# Patient Record
Sex: MALE | Race: WHITE | NOT HISPANIC OR LATINO
[De-identification: names, ages, dates, MRNs, and addresses within clinical notes are randomized per-mention and may not be internally consistent; named-entity substitution may affect disease eponyms.]

---

## 2017-06-16 ENCOUNTER — TRANSCRIPTION ENCOUNTER (OUTPATIENT)
Age: 82
End: 2017-06-16

## 2017-06-19 ENCOUNTER — OUTPATIENT (OUTPATIENT)
Dept: OUTPATIENT SERVICES | Facility: HOSPITAL | Age: 82
LOS: 1 days | End: 2017-06-19
Payer: MEDICARE

## 2017-06-19 DIAGNOSIS — H26.491 OTHER SECONDARY CATARACT, RIGHT EYE: ICD-10-CM

## 2017-06-19 DIAGNOSIS — Z98.89 OTHER SPECIFIED POSTPROCEDURAL STATES: Chronic | ICD-10-CM

## 2017-06-19 PROCEDURE — 66821 AFTER CATARACT LASER SURGERY: CPT | Mod: RT

## 2018-09-08 ENCOUNTER — EMERGENCY (EMERGENCY)
Facility: HOSPITAL | Age: 83
LOS: 0 days | Discharge: HOME | End: 2018-09-08
Attending: EMERGENCY MEDICINE | Admitting: EMERGENCY MEDICINE
Payer: MEDICARE

## 2018-09-08 VITALS
DIASTOLIC BLOOD PRESSURE: 72 MMHG | OXYGEN SATURATION: 98 % | SYSTOLIC BLOOD PRESSURE: 151 MMHG | HEART RATE: 60 BPM | RESPIRATION RATE: 18 BRPM

## 2018-09-08 VITALS
OXYGEN SATURATION: 100 % | RESPIRATION RATE: 18 BRPM | DIASTOLIC BLOOD PRESSURE: 85 MMHG | SYSTOLIC BLOOD PRESSURE: 159 MMHG | HEIGHT: 72 IN | WEIGHT: 195.11 LBS | TEMPERATURE: 98 F | HEART RATE: 56 BPM

## 2018-09-08 DIAGNOSIS — K62.5 HEMORRHAGE OF ANUS AND RECTUM: ICD-10-CM

## 2018-09-08 DIAGNOSIS — N40.0 BENIGN PROSTATIC HYPERPLASIA WITHOUT LOWER URINARY TRACT SYMPTOMS: ICD-10-CM

## 2018-09-08 DIAGNOSIS — K64.9 UNSPECIFIED HEMORRHOIDS: ICD-10-CM

## 2018-09-08 DIAGNOSIS — Z79.02 LONG TERM (CURRENT) USE OF ANTITHROMBOTICS/ANTIPLATELETS: ICD-10-CM

## 2018-09-08 DIAGNOSIS — Z98.89 OTHER SPECIFIED POSTPROCEDURAL STATES: Chronic | ICD-10-CM

## 2018-09-08 DIAGNOSIS — I10 ESSENTIAL (PRIMARY) HYPERTENSION: ICD-10-CM

## 2018-09-08 DIAGNOSIS — E78.5 HYPERLIPIDEMIA, UNSPECIFIED: ICD-10-CM

## 2018-09-08 DIAGNOSIS — E03.9 HYPOTHYROIDISM, UNSPECIFIED: ICD-10-CM

## 2018-09-08 LAB
ABO RH CONFIRMATION: SIGNIFICANT CHANGE UP
ALBUMIN SERPL ELPH-MCNC: 4 G/DL — SIGNIFICANT CHANGE UP (ref 3.3–5)
ALP SERPL-CCNC: 83 U/L — SIGNIFICANT CHANGE UP (ref 40–120)
ALT FLD-CCNC: 22 U/L — SIGNIFICANT CHANGE UP (ref 12–78)
ANION GAP SERPL CALC-SCNC: 9 MMOL/L — SIGNIFICANT CHANGE UP (ref 5–17)
APTT BLD: 32.2 SEC — SIGNIFICANT CHANGE UP (ref 27.5–37.4)
AST SERPL-CCNC: 22 U/L — SIGNIFICANT CHANGE UP (ref 15–37)
BASOPHILS # BLD AUTO: 0.04 K/UL — SIGNIFICANT CHANGE UP (ref 0–0.2)
BASOPHILS NFR BLD AUTO: 0.5 % — SIGNIFICANT CHANGE UP (ref 0–2)
BILIRUB SERPL-MCNC: 1.2 MG/DL — SIGNIFICANT CHANGE UP (ref 0.2–1.2)
BLD GP AB SCN SERPL QL: SIGNIFICANT CHANGE UP
BUN SERPL-MCNC: 25 MG/DL — HIGH (ref 7–23)
CALCIUM SERPL-MCNC: 9.6 MG/DL — SIGNIFICANT CHANGE UP (ref 8.5–10.1)
CHLORIDE SERPL-SCNC: 105 MMOL/L — SIGNIFICANT CHANGE UP (ref 96–108)
CO2 SERPL-SCNC: 28 MMOL/L — SIGNIFICANT CHANGE UP (ref 22–31)
CREAT SERPL-MCNC: 1.4 MG/DL — HIGH (ref 0.5–1.3)
EOSINOPHIL # BLD AUTO: 0.14 K/UL — SIGNIFICANT CHANGE UP (ref 0–0.5)
EOSINOPHIL NFR BLD AUTO: 1.6 % — SIGNIFICANT CHANGE UP (ref 0–6)
GLUCOSE SERPL-MCNC: 91 MG/DL — SIGNIFICANT CHANGE UP (ref 70–99)
HCT VFR BLD CALC: 46.4 % — SIGNIFICANT CHANGE UP (ref 39–50)
HGB BLD-MCNC: 15.9 G/DL — SIGNIFICANT CHANGE UP (ref 13–17)
IMM GRANULOCYTES NFR BLD AUTO: 0.3 % — SIGNIFICANT CHANGE UP (ref 0–1.5)
INR BLD: 1.08 RATIO — SIGNIFICANT CHANGE UP (ref 0.88–1.16)
LYMPHOCYTES # BLD AUTO: 2.14 K/UL — SIGNIFICANT CHANGE UP (ref 1–3.3)
LYMPHOCYTES # BLD AUTO: 24.3 % — SIGNIFICANT CHANGE UP (ref 13–44)
MCHC RBC-ENTMCNC: 29.9 PG — SIGNIFICANT CHANGE UP (ref 27–34)
MCHC RBC-ENTMCNC: 34.3 GM/DL — SIGNIFICANT CHANGE UP (ref 32–36)
MCV RBC AUTO: 87.4 FL — SIGNIFICANT CHANGE UP (ref 80–100)
MONOCYTES # BLD AUTO: 0.71 K/UL — SIGNIFICANT CHANGE UP (ref 0–0.9)
MONOCYTES NFR BLD AUTO: 8.1 % — SIGNIFICANT CHANGE UP (ref 2–14)
NEUTROPHILS # BLD AUTO: 5.75 K/UL — SIGNIFICANT CHANGE UP (ref 1.8–7.4)
NEUTROPHILS NFR BLD AUTO: 65.2 % — SIGNIFICANT CHANGE UP (ref 43–77)
NRBC # BLD: 0 /100 WBCS — SIGNIFICANT CHANGE UP (ref 0–0)
PLATELET # BLD AUTO: 201 K/UL — SIGNIFICANT CHANGE UP (ref 150–400)
POTASSIUM SERPL-MCNC: 3.7 MMOL/L — SIGNIFICANT CHANGE UP (ref 3.5–5.3)
POTASSIUM SERPL-SCNC: 3.7 MMOL/L — SIGNIFICANT CHANGE UP (ref 3.5–5.3)
PROT SERPL-MCNC: 7.5 GM/DL — SIGNIFICANT CHANGE UP (ref 6–8.3)
PROTHROM AB SERPL-ACNC: 11.7 SEC — SIGNIFICANT CHANGE UP (ref 9.8–12.7)
RBC # BLD: 5.31 M/UL — SIGNIFICANT CHANGE UP (ref 4.2–5.8)
RBC # FLD: 13.3 % — SIGNIFICANT CHANGE UP (ref 10.3–14.5)
SODIUM SERPL-SCNC: 142 MMOL/L — SIGNIFICANT CHANGE UP (ref 135–145)
TYPE + AB SCN PNL BLD: SIGNIFICANT CHANGE UP
WBC # BLD: 8.81 K/UL — SIGNIFICANT CHANGE UP (ref 3.8–10.5)
WBC # FLD AUTO: 8.81 K/UL — SIGNIFICANT CHANGE UP (ref 3.8–10.5)

## 2018-09-08 PROCEDURE — 99284 EMERGENCY DEPT VISIT MOD MDM: CPT

## 2018-09-08 RX ORDER — LOSARTAN POTASSIUM 100 MG/1
1 TABLET, FILM COATED ORAL
Qty: 0 | Refills: 0 | COMMUNITY

## 2018-09-08 RX ORDER — SODIUM CHLORIDE 9 MG/ML
3 INJECTION INTRAMUSCULAR; INTRAVENOUS; SUBCUTANEOUS ONCE
Qty: 0 | Refills: 0 | Status: COMPLETED | OUTPATIENT
Start: 2018-09-08 | End: 2018-09-08

## 2018-09-08 RX ADMIN — SODIUM CHLORIDE 3 MILLILITER(S): 9 INJECTION INTRAMUSCULAR; INTRAVENOUS; SUBCUTANEOUS at 18:41

## 2018-09-08 NOTE — ED PROVIDER NOTE - PMH
Benign prostatic hypertrophy    Essential tremor    Hemorrhoids    Hypercholesterolemia    Hypertension    Hypothyroid

## 2018-09-08 NOTE — ED PROVIDER NOTE - OBJECTIVE STATEMENT
83 y/o male with a PMHx of HTN, HLD, and Hypothyroidism, hemorrhoids presenting to the ED with wife c/o rectal bleeding today. Pt reports that he was sitting on the toilet and did not have a bowel movement but when he stood up, he noticed bright red blood filling the toilet. Reports that he after wiping, he did not see any blood on the toilet paper. Pt sat on the toilet again, saw blood in toilet, wiped, but there was no blood on toilet paper again. Pt had colonoscopy performed two years ago which showed one polyp but otherwise unremarkable. Biopsy was performed on polyp and it was determined to be benign.

## 2018-09-08 NOTE — ED ADULT NURSE NOTE - NSIMPLEMENTINTERV_GEN_ALL_ED
Implemented All Universal Safety Interventions:  [place] to call system. Call bell, personal items and telephone within reach. Instruct patient to call for assistance. Room bathroom lighting operational. Non-slip footwear when patient is off stretcher. Physically safe environment: no spills, clutter or unnecessary equipment. Stretcher in lowest position, wheels locked, appropriate side rails in place.

## 2018-09-08 NOTE — ED STATDOCS - NS_ ATTENDINGSCRIBEDETAILS _ED_A_ED_FT
I Michele Ramesh MD saw and examined the patient. Scribe documented for me and under my supervision. I have modified the scribe's documentation where necessary to reflect my history, physical exam and other relevant documentations pertinent to the care of the patient.

## 2018-09-08 NOTE — ED PROVIDER NOTE - MEDICAL DECISION MAKING DETAILS
No rectal bleeding on exam.  Guaiac negative.  Pt with hemorrhoids, likely cause.  Hgb normal.  INR normal.  Pt currently asymptomatic.  Okay for d/c home, f/u with GI Dr. Ramirez.

## 2018-09-08 NOTE — ED STATDOCS - PROGRESS NOTE DETAILS
Fanny GUTIÉRREZ for ED attending, Dr. Ramesh: 83 y/o M with a PMHx of HTN, HLD, and Hypothyroidism presenting to the ED with wife c/o rectal bleeding today. Pt reports that he was sitting on the toilet and did not have a bowel movement but when he stood up, he noticed bright red blood filling the toilet. Reports that he after wiping, he did not see any blood on the toilet paper. Pt had colonoscopy performed two years ago which showed one polyp but otherwise unremarkable. Biopsy was performed on polyp and it was determined to be benign. Will send patient to main ED for further evaluation.

## 2018-09-08 NOTE — ED ADULT NURSE NOTE - PMH
Benign prostatic hypertrophy    Essential tremor    Hypercholesterolemia    Hypertension    Hypothyroid

## 2018-09-08 NOTE — ED PROVIDER NOTE - GASTROINTESTINAL, MLM
Abdomen soft, non-tender, no guarding. Multiple hemorrhoids. No obvious fissures.  Mild pain with rectal exam. No active bleeding. Guaiac negative.

## 2021-07-16 PROBLEM — K64.9 UNSPECIFIED HEMORRHOIDS: Chronic | Status: ACTIVE | Noted: 2018-09-08

## 2021-10-07 ENCOUNTER — APPOINTMENT (OUTPATIENT)
Dept: GASTROENTEROLOGY | Facility: CLINIC | Age: 86
End: 2021-10-07
Payer: MEDICARE

## 2021-10-07 VITALS
WEIGHT: 197 LBS | HEART RATE: 56 BPM | SYSTOLIC BLOOD PRESSURE: 125 MMHG | DIASTOLIC BLOOD PRESSURE: 74 MMHG | BODY MASS INDEX: 26.68 KG/M2 | HEIGHT: 72 IN

## 2021-10-07 DIAGNOSIS — Z12.11 ENCOUNTER FOR SCREENING FOR MALIGNANT NEOPLASM OF COLON: ICD-10-CM

## 2021-10-07 DIAGNOSIS — Z12.12 ENCOUNTER FOR SCREENING FOR MALIGNANT NEOPLASM OF COLON: ICD-10-CM

## 2021-10-07 PROCEDURE — 99202 OFFICE O/P NEW SF 15 MIN: CPT

## 2021-10-07 NOTE — HISTORY OF PRESENT ILLNESS
[FreeTextEntry1] : The patient has enjoyed good health remains on Plavix for coronary artery disease had a colonoscopy in [place] 5 years ago, which was reportedly unrevealing, here for followup. Colon cancer screening. He denies any change in bowel habits. He denies abdominal pain. He denies any reports of anemia

## 2022-05-16 RX ORDER — INDOMETHACIN 50 MG
1 CAPSULE ORAL
Qty: 0 | Refills: 0 | DISCHARGE
Start: 2022-05-16

## 2022-05-17 ENCOUNTER — NON-APPOINTMENT (OUTPATIENT)
Age: 87
End: 2022-05-17

## 2022-05-18 ENCOUNTER — INPATIENT (INPATIENT)
Facility: HOSPITAL | Age: 87
LOS: 0 days | Discharge: HOME | DRG: 310 | End: 2022-05-19
Attending: FAMILY MEDICINE | Admitting: INTERNAL MEDICINE
Payer: MEDICARE

## 2022-05-18 VITALS
HEART RATE: 52 BPM | WEIGHT: 195.11 LBS | RESPIRATION RATE: 18 BRPM | SYSTOLIC BLOOD PRESSURE: 108 MMHG | OXYGEN SATURATION: 100 % | HEIGHT: 72 IN | DIASTOLIC BLOOD PRESSURE: 57 MMHG | TEMPERATURE: 99 F

## 2022-05-18 DIAGNOSIS — Z98.89 OTHER SPECIFIED POSTPROCEDURAL STATES: Chronic | ICD-10-CM

## 2022-05-18 DIAGNOSIS — H53.9 UNSPECIFIED VISUAL DISTURBANCE: ICD-10-CM

## 2022-05-18 LAB
ALBUMIN SERPL ELPH-MCNC: 3.4 G/DL — SIGNIFICANT CHANGE UP (ref 3.3–5)
ALP SERPL-CCNC: 78 U/L — SIGNIFICANT CHANGE UP (ref 40–120)
ALT FLD-CCNC: 25 U/L — SIGNIFICANT CHANGE UP (ref 12–78)
ANION GAP SERPL CALC-SCNC: 6 MMOL/L — SIGNIFICANT CHANGE UP (ref 5–17)
APTT BLD: 29.5 SEC — SIGNIFICANT CHANGE UP (ref 27.5–35.5)
AST SERPL-CCNC: 22 U/L — SIGNIFICANT CHANGE UP (ref 15–37)
BASOPHILS # BLD AUTO: 0.02 K/UL — SIGNIFICANT CHANGE UP (ref 0–0.2)
BASOPHILS NFR BLD AUTO: 0.3 % — SIGNIFICANT CHANGE UP (ref 0–2)
BILIRUB SERPL-MCNC: 0.9 MG/DL — SIGNIFICANT CHANGE UP (ref 0.2–1.2)
BUN SERPL-MCNC: 30 MG/DL — HIGH (ref 7–23)
CALCIUM SERPL-MCNC: 9.1 MG/DL — SIGNIFICANT CHANGE UP (ref 8.5–10.1)
CHLORIDE SERPL-SCNC: 108 MMOL/L — SIGNIFICANT CHANGE UP (ref 96–108)
CO2 SERPL-SCNC: 26 MMOL/L — SIGNIFICANT CHANGE UP (ref 22–31)
CREAT SERPL-MCNC: 1.55 MG/DL — HIGH (ref 0.5–1.3)
EGFR: 43 ML/MIN/1.73M2 — LOW
EOSINOPHIL # BLD AUTO: 0.16 K/UL — SIGNIFICANT CHANGE UP (ref 0–0.5)
EOSINOPHIL NFR BLD AUTO: 2.1 % — SIGNIFICANT CHANGE UP (ref 0–6)
FLUAV AG NPH QL: SIGNIFICANT CHANGE UP
FLUBV AG NPH QL: SIGNIFICANT CHANGE UP
GLUCOSE SERPL-MCNC: 104 MG/DL — HIGH (ref 70–99)
HCT VFR BLD CALC: 43.3 % — SIGNIFICANT CHANGE UP (ref 39–50)
HGB BLD-MCNC: 14 G/DL — SIGNIFICANT CHANGE UP (ref 13–17)
IMM GRANULOCYTES NFR BLD AUTO: 0.3 % — SIGNIFICANT CHANGE UP (ref 0–1.5)
INR BLD: 1.04 RATIO — SIGNIFICANT CHANGE UP (ref 0.88–1.16)
LYMPHOCYTES # BLD AUTO: 1.39 K/UL — SIGNIFICANT CHANGE UP (ref 1–3.3)
LYMPHOCYTES # BLD AUTO: 18.3 % — SIGNIFICANT CHANGE UP (ref 13–44)
MCHC RBC-ENTMCNC: 28.5 PG — SIGNIFICANT CHANGE UP (ref 27–34)
MCHC RBC-ENTMCNC: 32.3 GM/DL — SIGNIFICANT CHANGE UP (ref 32–36)
MCV RBC AUTO: 88.2 FL — SIGNIFICANT CHANGE UP (ref 80–100)
MONOCYTES # BLD AUTO: 0.65 K/UL — SIGNIFICANT CHANGE UP (ref 0–0.9)
MONOCYTES NFR BLD AUTO: 8.6 % — SIGNIFICANT CHANGE UP (ref 2–14)
NEUTROPHILS # BLD AUTO: 5.35 K/UL — SIGNIFICANT CHANGE UP (ref 1.8–7.4)
NEUTROPHILS NFR BLD AUTO: 70.4 % — SIGNIFICANT CHANGE UP (ref 43–77)
NT-PROBNP SERPL-SCNC: 1577 PG/ML — HIGH (ref 0–450)
PLATELET # BLD AUTO: 176 K/UL — SIGNIFICANT CHANGE UP (ref 150–400)
POTASSIUM SERPL-MCNC: 4.7 MMOL/L — SIGNIFICANT CHANGE UP (ref 3.5–5.3)
POTASSIUM SERPL-SCNC: 4.7 MMOL/L — SIGNIFICANT CHANGE UP (ref 3.5–5.3)
PROT SERPL-MCNC: 6.4 GM/DL — SIGNIFICANT CHANGE UP (ref 6–8.3)
PROTHROM AB SERPL-ACNC: 12.1 SEC — SIGNIFICANT CHANGE UP (ref 10.5–13.4)
RBC # BLD: 4.91 M/UL — SIGNIFICANT CHANGE UP (ref 4.2–5.8)
RBC # FLD: 13.9 % — SIGNIFICANT CHANGE UP (ref 10.3–14.5)
RSV RNA NPH QL NAA+NON-PROBE: SIGNIFICANT CHANGE UP
SARS-COV-2 RNA SPEC QL NAA+PROBE: SIGNIFICANT CHANGE UP
SODIUM SERPL-SCNC: 140 MMOL/L — SIGNIFICANT CHANGE UP (ref 135–145)
TROPONIN I, HIGH SENSITIVITY RESULT: 15.45 NG/L — SIGNIFICANT CHANGE UP
TROPONIN I, HIGH SENSITIVITY RESULT: 15.69 NG/L — SIGNIFICANT CHANGE UP
WBC # BLD: 7.59 K/UL — SIGNIFICANT CHANGE UP (ref 3.8–10.5)
WBC # FLD AUTO: 7.59 K/UL — SIGNIFICANT CHANGE UP (ref 3.8–10.5)

## 2022-05-18 PROCEDURE — 93306 TTE W/DOPPLER COMPLETE: CPT

## 2022-05-18 PROCEDURE — 93010 ELECTROCARDIOGRAM REPORT: CPT

## 2022-05-18 PROCEDURE — 71045 X-RAY EXAM CHEST 1 VIEW: CPT | Mod: 26

## 2022-05-18 PROCEDURE — 99285 EMERGENCY DEPT VISIT HI MDM: CPT

## 2022-05-18 PROCEDURE — 83735 ASSAY OF MAGNESIUM: CPT

## 2022-05-18 PROCEDURE — 80048 BASIC METABOLIC PNL TOTAL CA: CPT

## 2022-05-18 PROCEDURE — 36415 COLL VENOUS BLD VENIPUNCTURE: CPT

## 2022-05-18 RX ORDER — SODIUM CHLORIDE 9 MG/ML
1000 INJECTION INTRAMUSCULAR; INTRAVENOUS; SUBCUTANEOUS ONCE
Refills: 0 | Status: COMPLETED | OUTPATIENT
Start: 2022-05-18 | End: 2022-05-18

## 2022-05-18 RX ADMIN — SODIUM CHLORIDE 1000 MILLILITER(S): 9 INJECTION INTRAMUSCULAR; INTRAVENOUS; SUBCUTANEOUS at 22:06

## 2022-05-18 NOTE — ED PROVIDER NOTE - CLINICAL SUMMARY MEDICAL DECISION MAKING FREE TEXT BOX
Will eval for URI, appears to have 2nd degree Mobitz I heart block. Will eval for URI, appears to have 2nd degree Mobitz I heart block. Will continue monitoring for low HR and URI Sx at home. Will eval for URI, appears to have 2nd degree Mobitz I heart block. Will continue monitoring for bradycardia.

## 2022-05-18 NOTE — ED PROVIDER NOTE - PROGRESS NOTE DETAILS
Evaluated EKG from urgent care and had rate in 30s.  No clear p waves but appears to be regular R-R interval.  Would consider afib with block vs junctional rhythm vs slow a fib.  Possibly still small p waves, but unclear.  Discussed with Dr. Escalante who recommends admission to tele.  Discussed with Dr. Lord who recommends the same.  Will admit for further w/u and management.  Further care per inpatient treatment team.

## 2022-05-18 NOTE — ED PROVIDER NOTE - NS ED ROS FT
Constitutional: nad, well appearing  HEENT:  no nasal congestion, eye drainage or ear pain.    CVS:  no cp +bradycardia  Resp:  No sob, + cough  GI:  no abdominal pain, no nausea or vomiting  :  no dysuria  MSK: no joint pain or limited ROM  Skin: no rash  Neuro: no change in mental status or level of consciousness  Heme/lymph: no bleeding

## 2022-05-18 NOTE — ED PROVIDER NOTE - NSICDXPASTSURGICALHX_GEN_ALL_CORE_FT
PAST SURGICAL HISTORY:  History of cataract surgery, right     History of coronary angiogram 1997    S/P tonsillectomy

## 2022-05-18 NOTE — ED ADULT NURSE NOTE - OBJECTIVE STATEMENT
Pt presents to ED from urgent care for low heart rate. Pt states that he has been checking his heart rate at home with a pulse oximeter and it has been reading in the 30s which prompted him to visit urgent care. Pt had EKG done at urgent care and was told to go to ED since his heart rate was also "low" at urgent care. Pt endorses feeling tired recently. Denies chest pain, dizziness, LOC, headache, SOB.

## 2022-05-18 NOTE — ED PROVIDER NOTE - NSICDXPASTMEDICALHX_GEN_ALL_CORE_FT
PAST MEDICAL HISTORY:  Benign prostatic hypertrophy     Essential tremor     Hemorrhoids     Hypercholesterolemia     Hypertension     Hypothyroid

## 2022-05-18 NOTE — ED PROVIDER NOTE - OBJECTIVE STATEMENT
85 y/o male w/ a PMHx of HTN, HLD, Hypothyroidism, and hemorrhoids presents to the ED via EMS from  for bradycardia. Pt reports he measured his HR last night on a home machine which said he had a HR between 30-45 so went to  today for eval. Pt also reports he has a URI w/ associated cough and congestion. Denies CP, SOB, or dizziness. Pt denies hx of bradycardia. Pt HR 52 in ED.

## 2022-05-18 NOTE — ED ADULT TRIAGE NOTE - CHIEF COMPLAINT QUOTE
BIBA to ED from urgent care for c/o bradycardia, HR in 30's . PT c/o lightheadedness more than usual, cough, and voice hoarseness. Denies chest pain, SOB, and dizziness. Denies hx of bradycardia. HR 52bpm in triage, PT asymptomatic.

## 2022-05-18 NOTE — ED PROVIDER NOTE - PHYSICAL EXAMINATION
Constitutional: NAD, well appearing  HEENT: no rhinorrhea, PERRL, no oropharyngeal erythema or exudates, midline uvula.  TMs clear.  CVS: Bradycardic, regular rhythm, no m/r/g  Resp:  CTAB  GI: soft, ntnd  MSK:  no restriction to rom, full ROM to all extremities  Neuro:  A&Ox3, 5/5 strength to all extremities,  SILT to all extremities  Skin: no rash  psych: clear thought content  Heme/lymph:  No LAD

## 2022-05-19 ENCOUNTER — TRANSCRIPTION ENCOUNTER (OUTPATIENT)
Age: 87
End: 2022-05-19

## 2022-05-19 VITALS
DIASTOLIC BLOOD PRESSURE: 72 MMHG | RESPIRATION RATE: 18 BRPM | OXYGEN SATURATION: 98 % | HEART RATE: 73 BPM | SYSTOLIC BLOOD PRESSURE: 153 MMHG

## 2022-05-19 DIAGNOSIS — I25.10 ATHEROSCLEROTIC HEART DISEASE OF NATIVE CORONARY ARTERY WITHOUT ANGINA PECTORIS: ICD-10-CM

## 2022-05-19 DIAGNOSIS — G25.0 ESSENTIAL TREMOR: ICD-10-CM

## 2022-05-19 DIAGNOSIS — I10 ESSENTIAL (PRIMARY) HYPERTENSION: ICD-10-CM

## 2022-05-19 DIAGNOSIS — I44.1 ATRIOVENTRICULAR BLOCK, SECOND DEGREE: ICD-10-CM

## 2022-05-19 DIAGNOSIS — I34.0 NONRHEUMATIC MITRAL (VALVE) INSUFFICIENCY: ICD-10-CM

## 2022-05-19 DIAGNOSIS — I36.1 NONRHEUMATIC TRICUSPID (VALVE) INSUFFICIENCY: ICD-10-CM

## 2022-05-19 DIAGNOSIS — R00.1 BRADYCARDIA, UNSPECIFIED: ICD-10-CM

## 2022-05-19 LAB
ANION GAP SERPL CALC-SCNC: 5 MMOL/L — SIGNIFICANT CHANGE UP (ref 5–17)
BUN SERPL-MCNC: 24 MG/DL — HIGH (ref 7–23)
CALCIUM SERPL-MCNC: 8.9 MG/DL — SIGNIFICANT CHANGE UP (ref 8.5–10.1)
CHLORIDE SERPL-SCNC: 110 MMOL/L — HIGH (ref 96–108)
CO2 SERPL-SCNC: 26 MMOL/L — SIGNIFICANT CHANGE UP (ref 22–31)
CREAT SERPL-MCNC: 1.38 MG/DL — HIGH (ref 0.5–1.3)
EGFR: 50 ML/MIN/1.73M2 — LOW
GLUCOSE SERPL-MCNC: 133 MG/DL — HIGH (ref 70–99)
MAGNESIUM SERPL-MCNC: 2.1 MG/DL — SIGNIFICANT CHANGE UP (ref 1.6–2.6)
POTASSIUM SERPL-MCNC: 4.2 MMOL/L — SIGNIFICANT CHANGE UP (ref 3.5–5.3)
POTASSIUM SERPL-SCNC: 4.2 MMOL/L — SIGNIFICANT CHANGE UP (ref 3.5–5.3)
SODIUM SERPL-SCNC: 141 MMOL/L — SIGNIFICANT CHANGE UP (ref 135–145)

## 2022-05-19 PROCEDURE — 93306 TTE W/DOPPLER COMPLETE: CPT | Mod: 26

## 2022-05-19 PROCEDURE — 99236 HOSP IP/OBS SAME DATE HI 85: CPT

## 2022-05-19 PROCEDURE — 12345: CPT | Mod: NC

## 2022-05-19 RX ORDER — ZOLPIDEM TARTRATE 10 MG/1
5 TABLET ORAL AT BEDTIME
Refills: 0 | Status: DISCONTINUED | OUTPATIENT
Start: 2022-05-19 | End: 2022-05-19

## 2022-05-19 RX ORDER — CLOPIDOGREL BISULFATE 75 MG/1
75 TABLET, FILM COATED ORAL DAILY
Refills: 0 | Status: DISCONTINUED | OUTPATIENT
Start: 2022-05-19 | End: 2022-05-19

## 2022-05-19 RX ORDER — LANOLIN ALCOHOL/MO/W.PET/CERES
3 CREAM (GRAM) TOPICAL AT BEDTIME
Refills: 0 | Status: DISCONTINUED | OUTPATIENT
Start: 2022-05-19 | End: 2022-05-19

## 2022-05-19 RX ORDER — ALLOPURINOL 300 MG
200 TABLET ORAL DAILY
Refills: 0 | Status: DISCONTINUED | OUTPATIENT
Start: 2022-05-19 | End: 2022-05-19

## 2022-05-19 RX ORDER — LEVOTHYROXINE SODIUM 125 MCG
75 TABLET ORAL DAILY
Refills: 0 | Status: DISCONTINUED | OUTPATIENT
Start: 2022-05-19 | End: 2022-05-19

## 2022-05-19 RX ORDER — ATORVASTATIN CALCIUM 80 MG/1
1 TABLET, FILM COATED ORAL
Qty: 0 | Refills: 0 | DISCHARGE

## 2022-05-19 RX ORDER — ACETAMINOPHEN 500 MG
650 TABLET ORAL EVERY 6 HOURS
Refills: 0 | Status: DISCONTINUED | OUTPATIENT
Start: 2022-05-19 | End: 2022-05-19

## 2022-05-19 RX ORDER — LEVOTHYROXINE SODIUM 125 MCG
1 TABLET ORAL
Qty: 0 | Refills: 0 | DISCHARGE

## 2022-05-19 RX ORDER — LOSARTAN/HYDROCHLOROTHIAZIDE 100MG-25MG
1 TABLET ORAL
Qty: 0 | Refills: 0 | DISCHARGE

## 2022-05-19 RX ORDER — ONDANSETRON 8 MG/1
4 TABLET, FILM COATED ORAL EVERY 8 HOURS
Refills: 0 | Status: DISCONTINUED | OUTPATIENT
Start: 2022-05-19 | End: 2022-05-19

## 2022-05-19 RX ORDER — HEPARIN SODIUM 5000 [USP'U]/ML
5000 INJECTION INTRAVENOUS; SUBCUTANEOUS EVERY 8 HOURS
Refills: 0 | Status: DISCONTINUED | OUTPATIENT
Start: 2022-05-19 | End: 2022-05-19

## 2022-05-19 RX ORDER — DICLOFENAC SODIUM 30 MG/G
1 GEL TOPICAL
Qty: 0 | Refills: 0 | DISCHARGE

## 2022-05-19 RX ORDER — AMLODIPINE BESYLATE 2.5 MG/1
5 TABLET ORAL DAILY
Refills: 0 | Status: DISCONTINUED | OUTPATIENT
Start: 2022-05-19 | End: 2022-05-19

## 2022-05-19 RX ORDER — PROPRANOLOL HCL 160 MG
1 CAPSULE, EXTENDED RELEASE 24HR ORAL
Qty: 0 | Refills: 0 | DISCHARGE

## 2022-05-19 RX ORDER — PANTOPRAZOLE SODIUM 20 MG/1
40 TABLET, DELAYED RELEASE ORAL
Refills: 0 | Status: DISCONTINUED | OUTPATIENT
Start: 2022-05-19 | End: 2022-05-19

## 2022-05-19 RX ORDER — PANTOPRAZOLE SODIUM 20 MG/1
1 TABLET, DELAYED RELEASE ORAL
Qty: 0 | Refills: 0 | DISCHARGE

## 2022-05-19 RX ORDER — LOSARTAN POTASSIUM 100 MG/1
100 TABLET, FILM COATED ORAL DAILY
Refills: 0 | Status: DISCONTINUED | OUTPATIENT
Start: 2022-05-19 | End: 2022-05-19

## 2022-05-19 RX ORDER — ATORVASTATIN CALCIUM 80 MG/1
20 TABLET, FILM COATED ORAL AT BEDTIME
Refills: 0 | Status: DISCONTINUED | OUTPATIENT
Start: 2022-05-19 | End: 2022-05-19

## 2022-05-19 RX ADMIN — PANTOPRAZOLE SODIUM 40 MILLIGRAM(S): 20 TABLET, DELAYED RELEASE ORAL at 06:06

## 2022-05-19 RX ADMIN — Medication 75 MICROGRAM(S): at 06:06

## 2022-05-19 RX ADMIN — LOSARTAN POTASSIUM 100 MILLIGRAM(S): 100 TABLET, FILM COATED ORAL at 09:13

## 2022-05-19 RX ADMIN — SODIUM CHLORIDE 1000 MILLILITER(S): 9 INJECTION INTRAMUSCULAR; INTRAVENOUS; SUBCUTANEOUS at 02:47

## 2022-05-19 RX ADMIN — AMLODIPINE BESYLATE 5 MILLIGRAM(S): 2.5 TABLET ORAL at 09:13

## 2022-05-19 RX ADMIN — Medication 200 MILLIGRAM(S): at 09:14

## 2022-05-19 RX ADMIN — CLOPIDOGREL BISULFATE 75 MILLIGRAM(S): 75 TABLET, FILM COATED ORAL at 09:13

## 2022-05-19 NOTE — PATIENT PROFILE ADULT - FALL HARM RISK - HARM RISK INTERVENTIONS

## 2022-05-19 NOTE — DISCHARGE NOTE PROVIDER - HOSPITAL COURSE
CC: Symptomatic Bradycardia (19 May 2022 07:37)    HPI: 87 y/o Male presented to the ED via EMS from  for bradycardia. Patient reported he measured his HR last night on a home machine which said he had a HR between 30-45 so went to  today for eval. Patient also reported he had a URI w/ associated cough and congestion. He has no URI symptoms now. He felt dizzy when he went to  with low HR. No dizziness now. No LOC. No fall. Denies Chest pain, SOB. Patient denies any hx of bradycardia.  (19 May 2022 02:56)    INTERVAL HPI/OVERNIGHT EVENTS: no complaints, bradycardia resolved off propranolol    Vital Signs Last 24 Hrs  T(C): 36.2 (19 May 2022 08:51), Max: 37 (18 May 2022 17:36)  T(F): 97.2 (19 May 2022 08:51), Max: 98.6 (18 May 2022 17:36)  HR: 71 (19 May 2022 07:00) (52 - 75)  BP: 151/77 (19 May 2022 07:00) (108/57 - 151/77)  BP(mean): 97 (19 May 2022 07:00) (94 - 100)  RR: 10 (19 May 2022 07:00) (10 - 20)  SpO2: 99% (19 May 2022 04:17) (96% - 100%)  I&O's Detail    18 May 2022 07:01  -  19 May 2022 07:00  --------------------------------------------------------  IN:  Total IN: 0 mL    OUT:    Voided (mL): 200 mL  Total OUT: 200 mL    Total NET: -200 mL    All meds/labs/chart personally reviewed.    RADIOLOGY & ADDITIONAL TESTS: personally visualized    Symptomatic bradycardia - resolved off BBL, follow up with cardiology for outpatient remote monitor.  Discussed with Dr. Haines.  Medically stable for DC  F/u TTE as outpt

## 2022-05-19 NOTE — DISCHARGE NOTE NURSING/CASE MANAGEMENT/SOCIAL WORK - PATIENT PORTAL LINK FT
You can access the FollowMyHealth Patient Portal offered by [facility] by registering at the following website: [email or web address]. By joining [employer]’s FollowMyHealth portal, you will also be able to view your health information using other applications (apps) compatible with our system.

## 2022-05-19 NOTE — DISCHARGE NOTE PROVIDER - NSDCMRMEDTOKEN_GEN_ALL_CORE_FT
allopurinol 100 mg oral tablet: 2 tab(s) orally once a day  amLODIPine 5 mg oral tablet: 1 tab(s) orally once a day  Co-Q10 100 mg oral capsule: 1 cap(s) orally once a day  Fish Oil 1000 mg oral capsule: 1 cap(s) orally once a day  levothyroxine 75 mcg (0.075 mg) oral tablet: 1 tab(s) orally once a day  Lipitor 20 mg oral tablet: 1 tab(s) orally once a day (at bedtime)  Multiple Vitamins oral tablet: 1 tab(s) orally once a day  olmesartan 40 mg oral tablet: 1 tab(s) orally once a day  pantoprazole 40 mg oral delayed release tablet: 1 tab(s) orally once a day  Plavix 75 mg oral tablet: 1 tab(s) orally once a day  Turmeric 500 mg oral capsule: 1 cap(s) orally once a day  Vitamin D3 25 mcg (1000 intl units) oral tablet: 1 tab(s) orally once a day  zolpidem 10 mg oral tablet: 1 tab(s) orally once a day (at bedtime)

## 2022-05-19 NOTE — H&P ADULT - NSHPREVIEWOFSYSTEMS_GEN_ALL_CORE
Gen: No fever, chills, weakness  ENT: No visual changes or throat pain  Neck: No pain or stiffness  Respiratory: No cough or wheezing  Cardiovascular: No chest pain or palpitations,   Gastrointestinal: No abdominal pain, nausea, vomiting, constipation, or diarrhea  Hematologic: No easy bleeding or bruising  Neurologic: No numbness or focal weakness  Psych: No depression or insomnia  Skin: No rash or itching

## 2022-05-19 NOTE — H&P ADULT - NSICDXPASTMEDICALHX_GEN_ALL_CORE_FT
Thumb pain - try voltaren gel over the counter.  Check with your insurance about coverage for Shingles vaccine (Shingrix)  [facility] should mail a letter about when you can schedule. If they do not contact you, you can also schedule through the health department or [facility].    PAST MEDICAL HISTORY:  Benign prostatic hypertrophy     Essential tremor     Hemorrhoids     Hypercholesterolemia     Hypertension     Hypothyroid

## 2022-05-19 NOTE — PHARMACOTHERAPY INTERVENTION NOTE - COMMENTS
Medication reconciliation completed.  Reviewed Medication list and confirmed med allergies with patient; confirmed with Dr. First Meddenise.

## 2022-05-19 NOTE — CONSULT NOTE ADULT - SUBJECTIVE AND OBJECTIVE BOX
CHIEF COMPLAINT:  Patient is a 86y old  Male who presents with a chief complaint of Symptomatic Bradycardia (19 May 2022 02:56)      HPI: 22:  87 y/o Male well known to me with a h/o HTN, hyperlipidemia and ASHD, presented to the ED via EMS from  for bradycardia. Patient reported he measured his HR on a home machine which said he had a HR between 30-45 so went to  yesterday for eval. Patient also reported he had a URI w/ associated cough and congestion. He has no URI symptoms now. He felt dizzy when he went to  with low HR. No dizziness now. No LOC. No fall. Denies Chest pain, SOB. Patient denies any hx of bradycardia.  He has a h/o CAD having a cardiac cath at [facility] on 1997 showing a 70-80% mid RCA stenosis and 70-80% S2 stenosis and was treated medically  Post angiogram he developed aright groin pseudoaneurysm.  He had an abnormal stress test in  and CTA of the coronaries showed <50% LAD stenosis and patent mid-RCA, D1 and OM1 and was again treated medically and has done well since.  Stress Echo in my office on 21 showed LVEF=50-55% with mild diastolic LV dysfunction, mildly sd4tuscf LA with mild MR and TR but otherwise a normal study and no stress echo evidence for ischemia.  On telemetry his Propranolol was stopped and his EKG went from sinus bradycardia with Mobitz I to NSR.  He feels well denying anginal chest pains or increased SOB.  Cough persists from his recent URI.  He uses Propranolol both for his h/o CAD and tremors.  He is eager to go home.        PMHx:  PAST MEDICAL & SURGICAL HISTORY:  Essential tremor  Benign prostatic hypertrophy  Hypertension  Hypercholesterolemia  Hypothyroid  Hemorrhoids  S/P tonsillectomy  History of coronary angiogram-  History of cataract surgery, right      FAMILY HISTORY:   FAMILY HISTORY:  No pertinent family history in first degree relatives      ALLERGIES:  Allergies  No Known Allergies      REVIEW OF SYSTEMS:  10 point ROS was obtained  Pertinent positives and negatives are as above  All other review of systems is negative unless indicated above      Vital Signs Last 24 Hrs  T(C): 36.6 (19 May 2022 04:17), Max: 37 (18 May 2022 17:36)  T(F): 97.9 (19 May 2022 04:17), Max: 98.6 (18 May 2022 17:36)  HR: 75 (19 May 2022 06:19) (52 - 75)  BP: 144/83 (19 May 2022 06:19) (108/57 - 147/74)  BP(mean): 100 (19 May 2022 06:19) (94 - 100)  RR: 20 (19 May 2022 06:19) (16 - 20)  SpO2: 99% (19 May 2022 04:17) (96% - 100%)    I&O's Summary    18 May 2022 07:01  -  19 May 2022 07:00  --------------------------------------------------------  IN: 0 mL / OUT: 200 mL / NET: -200 mL      PHYSICAL EXAM:   Constitutional: NAD, awake and alert, well-developed  HEENT: PERR, EOMI, Normal Hearing, MMM  Neck: Soft and supple, No LAD, No JVD  Respiratory: Breath sounds are clear bilaterally, No wheezing, rales or rhonchi  Cardiovascular: S1 and S2, regular rate and rhythm, soft POONAM at LLSB and base as before, no gallops or rubs  Gastrointestinal: Bowel Sounds present, soft, nontender, nondistended, no guarding, no rebound  Extremities: No peripheral edema  Vascular: 2+ peripheral pulses  Neurological: A/O x 3, no focal deficits  Musculoskeletal: 5/5 strength b/l upper and lower extremities  Skin: No rashes      MEDICATIONS  (STANDING):  allopurinol 200 milliGRAM(s) Oral daily  amLODIPine   Tablet 5 milliGRAM(s) Oral daily  atorvastatin 20 milliGRAM(s) Oral at bedtime  clopidogrel Tablet 75 milliGRAM(s) Oral daily  heparin   Injectable 5000 Unit(s) SubCutaneous every 8 hours  levothyroxine 75 MICROGram(s) Oral daily  losartan 100 milliGRAM(s) Oral daily  pantoprazole    Tablet 40 milliGRAM(s) Oral before breakfast    MEDICATIONS  (PRN):  acetaminophen     Tablet .. 650 milliGRAM(s) Oral every 6 hours PRN Temp greater or equal to 38C (100.4F), Mild Pain (1 - 3)  aluminum hydroxide/magnesium hydroxide/simethicone Suspension 30 milliLiter(s) Oral every 4 hours PRN Dyspepsia  melatonin 3 milliGRAM(s) Oral at bedtime PRN Insomnia  ondansetron Injectable 4 milliGRAM(s) IV Push every 8 hours PRN Nausea and/or Vomiting  zolpidem 5 milliGRAM(s) Oral at bedtime PRN Insomnia  zolpidem 5 milliGRAM(s) Oral at bedtime PRN Insomnia      LABS: All Labs Reviewed:                        14.0   7.59  )-----------( 176      ( 18 May 2022 19:01 )             43.3         141  |  110<H>  |  24<H>  ----------------------------<  133<H>  4.2   |  26  |  1.38<H>    Ca    8.9      19 May 2022 06:17  Mg     2.1         TPro  6.4  /  Alb  3.4  /  TBili  0.9  /  DBili  x   /  AST  22  /  ALT  25  /  AlkPhos  78      PT/INR - ( 18 May 2022 19:01 )   PT: 12.1 sec;   INR: 1.04 ratio       PTT - ( 18 May 2022 19:01 )  PTT:29.5 sec    Troponin I, High Sensitivity (22 @ 21:59): 15.45  Troponin I, High Sensitivity (22 @ 19:01): 15.69    Serum Pro-Brain Natriuretic Peptide: 1577 pg/mL ( @ 19:01)    BLOOD CULTURES:   LIPID PROFILE     RADIOLOGY:    CXR: 22:  FINDINGS:  CATHETERS AND TUBES: None  PULMONARY: The visualized lungs are clear of airspace consolidations or effusions. No pneumothorax.  HEART/VASCULAR: The heart and mediastinum size and configuration are within normal limits.  BONES: Visualized osseous structures are intact.  IMPRESSION:   No radiographic evidence of active chest disease.      EK22:  Sinus bradycardia with occasional Mobitz I, LAD    TELEMETRY:  Sinus alfredo=Mobitz I=>NSR    ECHO:  Stress Echo in my office on 21 showed LVEF=50-55% with mild diastolic LV dysfunction, mildly qa5eviga LA with mild MR and TR but otherwise a normal study and no stress echo evidence for ischemia.

## 2022-05-19 NOTE — H&P ADULT - ASSESSMENT
A/P:    1.  Symptomatic Bradycardia  Sinus Pause  -hold Propanolol  -follow closely in CICU  -keep Pacer pad on  -follow Echo  -follow Cardiology consult    2.  STEF  -got IVF in ED already  -follow BMP    3.  Heparin for DVT ppx    4.  Code status: patient is in full code status.

## 2022-05-19 NOTE — H&P ADULT - HISTORY OF PRESENT ILLNESS
85 y/o Male presented to the ED via EMS from  for bradycardia. Patient reported he measured his HR last night on a home machine which said he had a HR between 30-45 so went to  today for eval. Patient also reported he had a URI w/ associated cough and congestion. He has no URI symptoms now. He felt dizzy when he went to  with low HR. No dizziness now. No LOC. No fall. Denies Chest pain, SOB. Patient denies any hx of bradycardia.

## 2022-05-19 NOTE — DISCHARGE NOTE PROVIDER - ATTENDING DISCHARGE PHYSICAL EXAMINATION:
PHYSICAL EXAM:    General: male in no acute distress  Eyes: PERRLA, EOMI; conjunctiva and sclera clear  Head: Normocephalic; atraumatic  ENMT: No nasal discharge; airway clear  Neck: Supple; non tender; no masses  Respiratory: No wheezes, rales or rhonchi  Cardiovascular: S1,S2 reg  Gastrointestinal: Soft non-tender non-distended; Normal bowel sounds  Genitourinary: No costovertebral angle tenderness  Extremities: Normal range of motion, No clubbing, cyanosis or edema  Vascular: Peripheral pulses palpable 2+ bilaterally  Neurological: Alert and oriented x4  Skin: Warm and dry.   Musculoskeletal: Normal tone  Psychiatric: Cooperative and appropriate

## 2022-05-19 NOTE — DISCHARGE NOTE PROVIDER - NSDCCPCAREPLAN_GEN_ALL_CORE_FT
PRINCIPAL DISCHARGE DIAGNOSIS  Diagnosis: Bradycardia  Assessment and Plan of Treatment: resolved, outpt monitor

## 2022-05-19 NOTE — ED ADULT NURSE REASSESSMENT NOTE - NS ED NURSE REASSESS COMMENT FT1
pt ambulated with steady gait to restroom. pt. with no complaints at this time. updated on plan for admission. awaiting bed placement.
received pt. from TEO Rivera. pt resting comfortably in stretcher with wife at bedside. pt. denies complaints at this time. updated pt. on plan of care. awaiting call from cardiology.
[phone]

## 2022-05-19 NOTE — CONSULT NOTE ADULT - ASSESSMENT
5/19/22:  Pt with above history on Propranolol of CAD but more for his tremors.  Agree with hold this and can probably be discharged and follow up with me for a multi-week monitor placement.  Continue his other meds for now.  Home if ok with medicine.

## 2022-05-19 NOTE — DISCHARGE NOTE NURSING/CASE MANAGEMENT/SOCIAL WORK - NSDCPEFALRISK_GEN_ALL_CORE
For information on Fall & Injury Prevention, visit: [email or web address] OR  [email or web address] OR  https://www.cdc.gov/steadi/patient.html

## 2022-05-19 NOTE — H&P ADULT - NSHPPHYSICALEXAM_GEN_ALL_CORE
T(C): 36.7 (05-19-22 @ 02:27), Max: 37 (05-18-22 @ 17:36)  HR: 72 (05-19-22 @ 02:27) (52 - 72)  BP: 139/77 (05-19-22 @ 02:27) (108/57 - 147/74)  RR: 16 (05-19-22 @ 02:27) (16 - 18)  SpO2: 97% (05-19-22 @ 02:27) (96% - 100%)    CONSTITUTIONAL: Well groomed, no apparent distress    EYES: PERRLA and symmetric, EOMI, No conjunctival or scleral injection, non-icteric    ENMT: Oral mucosa with moist membranes. No external nasal lesions; nasal mucosa not inflamed; normal dentition; no pharyngeal injection or exudates. Otoscopic exam with normal tympanic membranes; no gross hearing impairment noted.  	NECK: Supple, symmetric and without tracheal deviation; thyroid gland not enlarged and without palpable masses    RESPIRATORY: No respiratory distress, no use of accessory muscles; CTA b/l, no wheezes, rales or rhonchi, no dullness or hyperresonance to percussion, no tactile fremitus, no subcutaneous emphysema    CARDIOVASCULAR: +bradycardia, +S1S2, no murmurs, no rubs, no gallops; no JVD; no peripheral edema  	Vascular: no carotid bruits; no abdominal bruit; carotid pulse palpable, radial pulse palpable, femoral pulse palpable, dorsalis pedis pulse palpable, posterior tibialis pulse palpable    GASTROINTESTINAL: Soft, non tender, non distended, no rebound, no guarding; No palpable masses; no hepatosplenomegaly; no hernia palpated;  	  LYMPHATIC: No cervical LAD or tenderness; no axillary LAD or tenderness; no inguinal LAD or tenderness    MUSCULOSKELETAL: Normal gait and station; no digital clubbing or cyanosis; examination of the (head/neck, spine/ribs/pelvis, RUE, LUE, RLE, LLE) without misalignment, normal range of motion without pain, no spinal tenderness, normal muscle strength/tone    SKIN: No rashes or ulcers noted; no subcutaneous nodules or induration palpable    NEUROLOGIC: CN II-XII intact; normal reflexes in upper and lower extremities, sensation intact in upper and lower extremities b/l to light touch; Babinski down b/l; no Kernig’s sign, no Brudzinski’s sign    PSYCHIATRIC: Appropriate insight/judgment; A+O x 3, mood and affect appropriate, recent/remote memory intact

## 2022-05-23 DIAGNOSIS — N40.0 BENIGN PROSTATIC HYPERPLASIA WITHOUT LOWER URINARY TRACT SYMPTOMS: ICD-10-CM

## 2022-05-23 DIAGNOSIS — I10 ESSENTIAL (PRIMARY) HYPERTENSION: ICD-10-CM

## 2022-05-23 DIAGNOSIS — E78.00 PURE HYPERCHOLESTEROLEMIA, UNSPECIFIED: ICD-10-CM

## 2022-05-23 DIAGNOSIS — I08.1 RHEUMATIC DISORDERS OF BOTH MITRAL AND TRICUSPID VALVES: ICD-10-CM

## 2022-05-23 DIAGNOSIS — N17.9 ACUTE KIDNEY FAILURE, UNSPECIFIED: ICD-10-CM

## 2022-05-23 DIAGNOSIS — G25.0 ESSENTIAL TREMOR: ICD-10-CM

## 2022-05-23 DIAGNOSIS — R00.1 BRADYCARDIA, UNSPECIFIED: ICD-10-CM

## 2022-05-23 DIAGNOSIS — E03.9 HYPOTHYROIDISM, UNSPECIFIED: ICD-10-CM

## 2022-05-23 DIAGNOSIS — I44.1 ATRIOVENTRICULAR BLOCK, SECOND DEGREE: ICD-10-CM

## 2022-05-23 DIAGNOSIS — I25.10 ATHEROSCLEROTIC HEART DISEASE OF NATIVE CORONARY ARTERY WITHOUT ANGINA PECTORIS: ICD-10-CM

## 2022-05-23 DIAGNOSIS — H53.9 UNSPECIFIED VISUAL DISTURBANCE: ICD-10-CM

## 2022-06-05 ENCOUNTER — INPATIENT (INPATIENT)
Facility: HOSPITAL | Age: 87
LOS: 2 days | Discharge: HOME | DRG: 243 | End: 2022-06-08
Attending: FAMILY MEDICINE | Admitting: FAMILY MEDICINE
Payer: MEDICARE

## 2022-06-05 VITALS
TEMPERATURE: 97 F | DIASTOLIC BLOOD PRESSURE: 70 MMHG | HEART RATE: 40 BPM | OXYGEN SATURATION: 97 % | RESPIRATION RATE: 20 BRPM | SYSTOLIC BLOOD PRESSURE: 157 MMHG

## 2022-06-05 DIAGNOSIS — Z98.89 OTHER SPECIFIED POSTPROCEDURAL STATES: Chronic | ICD-10-CM

## 2022-06-05 DIAGNOSIS — R00.1 BRADYCARDIA, UNSPECIFIED: ICD-10-CM

## 2022-06-05 LAB
ALBUMIN SERPL ELPH-MCNC: 3.2 G/DL — LOW (ref 3.3–5)
ALP SERPL-CCNC: 79 U/L — SIGNIFICANT CHANGE UP (ref 40–120)
ALT FLD-CCNC: 23 U/L — SIGNIFICANT CHANGE UP (ref 12–78)
ANION GAP SERPL CALC-SCNC: 6 MMOL/L — SIGNIFICANT CHANGE UP (ref 5–17)
APTT BLD: 30.5 SEC — SIGNIFICANT CHANGE UP (ref 27.5–35.5)
APTT BLD: >200 SEC — CRITICAL HIGH (ref 27.5–35.5)
AST SERPL-CCNC: 19 U/L — SIGNIFICANT CHANGE UP (ref 15–37)
BASOPHILS # BLD AUTO: 0.03 K/UL — SIGNIFICANT CHANGE UP (ref 0–0.2)
BASOPHILS NFR BLD AUTO: 0.5 % — SIGNIFICANT CHANGE UP (ref 0–2)
BILIRUB SERPL-MCNC: 0.8 MG/DL — SIGNIFICANT CHANGE UP (ref 0.2–1.2)
BUN SERPL-MCNC: 31 MG/DL — HIGH (ref 7–23)
CALCIUM SERPL-MCNC: 8.8 MG/DL — SIGNIFICANT CHANGE UP (ref 8.5–10.1)
CHLORIDE SERPL-SCNC: 110 MMOL/L — HIGH (ref 96–108)
CO2 SERPL-SCNC: 23 MMOL/L — SIGNIFICANT CHANGE UP (ref 22–31)
CREAT SERPL-MCNC: 1.24 MG/DL — SIGNIFICANT CHANGE UP (ref 0.5–1.3)
EGFR: 57 ML/MIN/1.73M2 — LOW
EOSINOPHIL # BLD AUTO: 0.08 K/UL — SIGNIFICANT CHANGE UP (ref 0–0.5)
EOSINOPHIL NFR BLD AUTO: 1.3 % — SIGNIFICANT CHANGE UP (ref 0–6)
GLUCOSE SERPL-MCNC: 126 MG/DL — HIGH (ref 70–99)
HCT VFR BLD CALC: 40.7 % — SIGNIFICANT CHANGE UP (ref 39–50)
HCT VFR BLD CALC: 42.9 % — SIGNIFICANT CHANGE UP (ref 39–50)
HGB BLD-MCNC: 13.4 G/DL — SIGNIFICANT CHANGE UP (ref 13–17)
HGB BLD-MCNC: 13.9 G/DL — SIGNIFICANT CHANGE UP (ref 13–17)
IMM GRANULOCYTES NFR BLD AUTO: 0.3 % — SIGNIFICANT CHANGE UP (ref 0–1.5)
INR BLD: 1.04 RATIO — SIGNIFICANT CHANGE UP (ref 0.88–1.16)
LYMPHOCYTES # BLD AUTO: 1.48 K/UL — SIGNIFICANT CHANGE UP (ref 1–3.3)
LYMPHOCYTES # BLD AUTO: 23.6 % — SIGNIFICANT CHANGE UP (ref 13–44)
MAGNESIUM SERPL-MCNC: 2 MG/DL — SIGNIFICANT CHANGE UP (ref 1.6–2.6)
MCHC RBC-ENTMCNC: 28.8 PG — SIGNIFICANT CHANGE UP (ref 27–34)
MCHC RBC-ENTMCNC: 29 PG — SIGNIFICANT CHANGE UP (ref 27–34)
MCHC RBC-ENTMCNC: 32.4 GM/DL — SIGNIFICANT CHANGE UP (ref 32–36)
MCHC RBC-ENTMCNC: 32.9 GM/DL — SIGNIFICANT CHANGE UP (ref 32–36)
MCV RBC AUTO: 88.1 FL — SIGNIFICANT CHANGE UP (ref 80–100)
MCV RBC AUTO: 89 FL — SIGNIFICANT CHANGE UP (ref 80–100)
MONOCYTES # BLD AUTO: 0.52 K/UL — SIGNIFICANT CHANGE UP (ref 0–0.9)
MONOCYTES NFR BLD AUTO: 8.3 % — SIGNIFICANT CHANGE UP (ref 2–14)
NEUTROPHILS # BLD AUTO: 4.14 K/UL — SIGNIFICANT CHANGE UP (ref 1.8–7.4)
NEUTROPHILS NFR BLD AUTO: 66 % — SIGNIFICANT CHANGE UP (ref 43–77)
NT-PROBNP SERPL-SCNC: 2052 PG/ML — HIGH (ref 0–450)
PLATELET # BLD AUTO: 171 K/UL — SIGNIFICANT CHANGE UP (ref 150–400)
PLATELET # BLD AUTO: 186 K/UL — SIGNIFICANT CHANGE UP (ref 150–400)
POTASSIUM SERPL-MCNC: 4.5 MMOL/L — SIGNIFICANT CHANGE UP (ref 3.5–5.3)
POTASSIUM SERPL-SCNC: 4.5 MMOL/L — SIGNIFICANT CHANGE UP (ref 3.5–5.3)
PROT SERPL-MCNC: 6.2 GM/DL — SIGNIFICANT CHANGE UP (ref 6–8.3)
PROTHROM AB SERPL-ACNC: 12.1 SEC — SIGNIFICANT CHANGE UP (ref 10.5–13.4)
RBC # BLD: 4.62 M/UL — SIGNIFICANT CHANGE UP (ref 4.2–5.8)
RBC # BLD: 4.82 M/UL — SIGNIFICANT CHANGE UP (ref 4.2–5.8)
RBC # FLD: 13.6 % — SIGNIFICANT CHANGE UP (ref 10.3–14.5)
RBC # FLD: 13.8 % — SIGNIFICANT CHANGE UP (ref 10.3–14.5)
SARS-COV-2 RNA SPEC QL NAA+PROBE: SIGNIFICANT CHANGE UP
SODIUM SERPL-SCNC: 139 MMOL/L — SIGNIFICANT CHANGE UP (ref 135–145)
TROPONIN I, HIGH SENSITIVITY RESULT: 21.33 NG/L — SIGNIFICANT CHANGE UP
TROPONIN I, HIGH SENSITIVITY RESULT: 23.18 NG/L — SIGNIFICANT CHANGE UP
TROPONIN I, HIGH SENSITIVITY RESULT: 28.49 NG/L — SIGNIFICANT CHANGE UP
WBC # BLD: 6.27 K/UL — SIGNIFICANT CHANGE UP (ref 3.8–10.5)
WBC # BLD: 7.11 K/UL — SIGNIFICANT CHANGE UP (ref 3.8–10.5)
WBC # FLD AUTO: 6.27 K/UL — SIGNIFICANT CHANGE UP (ref 3.8–10.5)
WBC # FLD AUTO: 7.11 K/UL — SIGNIFICANT CHANGE UP (ref 3.8–10.5)

## 2022-06-05 PROCEDURE — 84100 ASSAY OF PHOSPHORUS: CPT

## 2022-06-05 PROCEDURE — 33208 INSRT HEART PM ATRIAL & VENT: CPT | Mod: KX

## 2022-06-05 PROCEDURE — 80048 BASIC METABOLIC PNL TOTAL CA: CPT

## 2022-06-05 PROCEDURE — 71045 X-RAY EXAM CHEST 1 VIEW: CPT | Mod: 26

## 2022-06-05 PROCEDURE — 97162 PT EVAL MOD COMPLEX 30 MIN: CPT | Mod: GP

## 2022-06-05 PROCEDURE — C1894: CPT

## 2022-06-05 PROCEDURE — 85027 COMPLETE CBC AUTOMATED: CPT

## 2022-06-05 PROCEDURE — 86900 BLOOD TYPING SEROLOGIC ABO: CPT

## 2022-06-05 PROCEDURE — 93306 TTE W/DOPPLER COMPLETE: CPT

## 2022-06-05 PROCEDURE — 85610 PROTHROMBIN TIME: CPT

## 2022-06-05 PROCEDURE — 83735 ASSAY OF MAGNESIUM: CPT

## 2022-06-05 PROCEDURE — 99222 1ST HOSP IP/OBS MODERATE 55: CPT

## 2022-06-05 PROCEDURE — 99285 EMERGENCY DEPT VISIT HI MDM: CPT

## 2022-06-05 PROCEDURE — 93010 ELECTROCARDIOGRAM REPORT: CPT

## 2022-06-05 PROCEDURE — 97530 THERAPEUTIC ACTIVITIES: CPT | Mod: GP

## 2022-06-05 PROCEDURE — C1898: CPT

## 2022-06-05 PROCEDURE — 36415 COLL VENOUS BLD VENIPUNCTURE: CPT

## 2022-06-05 PROCEDURE — 93005 ELECTROCARDIOGRAM TRACING: CPT

## 2022-06-05 PROCEDURE — 97116 GAIT TRAINING THERAPY: CPT | Mod: GP

## 2022-06-05 PROCEDURE — 85730 THROMBOPLASTIN TIME PARTIAL: CPT

## 2022-06-05 PROCEDURE — 84443 ASSAY THYROID STIM HORMONE: CPT

## 2022-06-05 PROCEDURE — 85025 COMPLETE CBC W/AUTO DIFF WBC: CPT

## 2022-06-05 PROCEDURE — C1889: CPT

## 2022-06-05 PROCEDURE — 86901 BLOOD TYPING SEROLOGIC RH(D): CPT

## 2022-06-05 PROCEDURE — 71045 X-RAY EXAM CHEST 1 VIEW: CPT

## 2022-06-05 PROCEDURE — 86850 RBC ANTIBODY SCREEN: CPT

## 2022-06-05 PROCEDURE — 80053 COMPREHEN METABOLIC PANEL: CPT

## 2022-06-05 PROCEDURE — C1785: CPT

## 2022-06-05 PROCEDURE — 84484 ASSAY OF TROPONIN QUANT: CPT

## 2022-06-05 RX ORDER — ONDANSETRON 8 MG/1
4 TABLET, FILM COATED ORAL EVERY 8 HOURS
Refills: 0 | Status: DISCONTINUED | OUTPATIENT
Start: 2022-06-05 | End: 2022-06-08

## 2022-06-05 RX ORDER — ZOLPIDEM TARTRATE 10 MG/1
5 TABLET ORAL AT BEDTIME
Refills: 0 | Status: DISCONTINUED | OUTPATIENT
Start: 2022-06-05 | End: 2022-06-08

## 2022-06-05 RX ORDER — LOSARTAN POTASSIUM 100 MG/1
100 TABLET, FILM COATED ORAL DAILY
Refills: 0 | Status: DISCONTINUED | OUTPATIENT
Start: 2022-06-05 | End: 2022-06-08

## 2022-06-05 RX ORDER — HEPARIN SODIUM 5000 [USP'U]/ML
7500 INJECTION INTRAVENOUS; SUBCUTANEOUS ONCE
Refills: 0 | Status: COMPLETED | OUTPATIENT
Start: 2022-06-05 | End: 2022-06-05

## 2022-06-05 RX ORDER — ATORVASTATIN CALCIUM 80 MG/1
20 TABLET, FILM COATED ORAL AT BEDTIME
Refills: 0 | Status: DISCONTINUED | OUTPATIENT
Start: 2022-06-05 | End: 2022-06-08

## 2022-06-05 RX ORDER — LANOLIN ALCOHOL/MO/W.PET/CERES
3 CREAM (GRAM) TOPICAL AT BEDTIME
Refills: 0 | Status: DISCONTINUED | OUTPATIENT
Start: 2022-06-05 | End: 2022-06-08

## 2022-06-05 RX ORDER — PANTOPRAZOLE SODIUM 20 MG/1
40 TABLET, DELAYED RELEASE ORAL
Refills: 0 | Status: DISCONTINUED | OUTPATIENT
Start: 2022-06-05 | End: 2022-06-08

## 2022-06-05 RX ORDER — HEPARIN SODIUM 5000 [USP'U]/ML
INJECTION INTRAVENOUS; SUBCUTANEOUS
Qty: 25000 | Refills: 0 | Status: DISCONTINUED | OUTPATIENT
Start: 2022-06-05 | End: 2022-06-08

## 2022-06-05 RX ORDER — ACETAMINOPHEN 500 MG
650 TABLET ORAL EVERY 6 HOURS
Refills: 0 | Status: DISCONTINUED | OUTPATIENT
Start: 2022-06-05 | End: 2022-06-08

## 2022-06-05 RX ORDER — ATROPINE SULFATE 0.1 MG/ML
0.5 SYRINGE (ML) INJECTION ONCE
Refills: 0 | Status: DISCONTINUED | OUTPATIENT
Start: 2022-06-05 | End: 2022-06-08

## 2022-06-05 RX ORDER — ALLOPURINOL 300 MG
200 TABLET ORAL DAILY
Refills: 0 | Status: DISCONTINUED | OUTPATIENT
Start: 2022-06-05 | End: 2022-06-08

## 2022-06-05 RX ORDER — HEPARIN SODIUM 5000 [USP'U]/ML
7500 INJECTION INTRAVENOUS; SUBCUTANEOUS EVERY 6 HOURS
Refills: 0 | Status: DISCONTINUED | OUTPATIENT
Start: 2022-06-05 | End: 2022-06-08

## 2022-06-05 RX ORDER — AMLODIPINE BESYLATE 2.5 MG/1
5 TABLET ORAL DAILY
Refills: 0 | Status: DISCONTINUED | OUTPATIENT
Start: 2022-06-05 | End: 2022-06-08

## 2022-06-05 RX ORDER — HEPARIN SODIUM 5000 [USP'U]/ML
3500 INJECTION INTRAVENOUS; SUBCUTANEOUS EVERY 6 HOURS
Refills: 0 | Status: DISCONTINUED | OUTPATIENT
Start: 2022-06-05 | End: 2022-06-08

## 2022-06-05 RX ORDER — LEVOTHYROXINE SODIUM 125 MCG
75 TABLET ORAL DAILY
Refills: 0 | Status: DISCONTINUED | OUTPATIENT
Start: 2022-06-05 | End: 2022-06-08

## 2022-06-05 RX ORDER — CHOLECALCIFEROL (VITAMIN D3) 125 MCG
1000 CAPSULE ORAL DAILY
Refills: 0 | Status: DISCONTINUED | OUTPATIENT
Start: 2022-06-05 | End: 2022-06-08

## 2022-06-05 RX ADMIN — HEPARIN SODIUM 7500 UNIT(S): 5000 INJECTION INTRAVENOUS; SUBCUTANEOUS at 16:19

## 2022-06-05 RX ADMIN — HEPARIN SODIUM 0 UNIT(S)/HR: 5000 INJECTION INTRAVENOUS; SUBCUTANEOUS at 22:59

## 2022-06-05 RX ADMIN — HEPARIN SODIUM 1700 UNIT(S)/HR: 5000 INJECTION INTRAVENOUS; SUBCUTANEOUS at 16:17

## 2022-06-05 RX ADMIN — HEPARIN SODIUM 1400 UNIT(S)/HR: 5000 INJECTION INTRAVENOUS; SUBCUTANEOUS at 23:58

## 2022-06-05 NOTE — ED PROVIDER NOTE - SKIN, MLM
Skin normal color for race, warm, dry and intact. No evidence of rash.  No tactile warmth, no diaphoresis.

## 2022-06-05 NOTE — ED PROVIDER NOTE - OBJECTIVE STATEMENT
87 yo WM, h/o hypothyroid, HTN, HLD, CAD, ambulatory to ED c/o'ing lightheaded, home monitor + bradycardia into low 40s.  Pt s/p admission HH mid-May '22 for symptomatic sinus bradycardia: Propranolol held during stay, D/C'ed on outpt Holter monitor X 2+ weeks & restarted by Dr. Haines on 1/2 dose propnalol (10 mg bid); Holter returned to office 6/3.  Pt reports felt more tired, lightheaded starting last tara, home monitor + alfredo into low 40s.  Pt hasn't taken his Propranolol since yesterday AM.  No asscociated cp, SOB, LOC, palps.  PCP: Jax   Cardio: OMAR Haines 87 yo WM, h/o hypothyroid, HTN, HLD, CAD, ambulatory to ED c/o'ing lightheaded, home monitor + bradycardia into low 40s.  Pt s/p admission HH mid-May '22 for symptomatic sinus bradycardia: Propranolol held during stay, D/C'ed on outpt Holter monitor X 2+ weeks & restarted by Dr. Haines on 1/2 dose propranolol (10 mg bid); Holter returned to office 6/3.  Pt reports felt more tired, lightheaded starting last tara, home monitor + alfredo into low 40s.  Pt hasn't taken his Propranolol since yesterday AM.  No asscociated cp, SOB, LOC, palps.  PCP: Jax   Cardio: OMAR Haines

## 2022-06-05 NOTE — ED PROVIDER NOTE - MUSCULOSKELETAL, MLM
Spine appears normal, range of motion is not limited, no muscle or joint tenderness.  SOLARES x 4, no focal tender.  1+ pitting edema B/L pre-tibial.

## 2022-06-05 NOTE — H&P ADULT - ASSESSMENT
86 year old male patient with bradycardia      -Admit to CICU      # Bradycardia  -monitor on tele  -will hold plavix in anticipation for possible EP intervention  -on heparin infusion  -check mg, phos, tsh  -get echo  -Primary Cardiologist to evaluate pt  -EP to evaluate pt    #HTN  -on Norvasc    # Hypothyroidism  -0n synthroid    # DVT ppx  -on heparin infusion

## 2022-06-05 NOTE — PATIENT PROFILE ADULT - FALL HARM RISK - HARM RISK INTERVENTIONS

## 2022-06-05 NOTE — ED ADULT NURSE REASSESSMENT NOTE - NS ED NURSE REASSESS COMMENT FT1
Assumed care of patient from Shayan BRUCE. Pt resting comfortably in bed A&x4. Pt on cardiac monitor. VSS. Respirations even and unlabored. Skin warm and dry. Heparin running @17 ml/hr. Denies pain or further needs at this time. Will continue to monitor.

## 2022-06-05 NOTE — ED PROVIDER NOTE - OTHER DETAILS FREE TEXT FOR MDM ADDL HISTORY OBTAINED FROM QUESTION
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  He had an abnormal stress test in 2007 and CTA of the coronaries showed <50% LAD stenosis and patent mid-RCA, D1 and OM1 and was again treated medically and has done well since.  Stress Echo in Cardio office on 5/26/21 showed LVEF=50-55% with mild diastolic LV dysfunction, mildly [id number] LA with mild MR and TR but otherwise a normal study and no stress echo evidence for ischemia. 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  He had an abnormal stress test in 2007 and CTA of the coronaries showed <50% LAD stenosis and patent mid-RCA, D1 and OM1 and was again treated medically and has done well since.  Stress Echo in Cardio office on 5/26/21 showed LVEF=50-55% with mild diastolic LV dysfunction, mildly [id number] LA with mild MR and TR but otherwise a normal study and no stress echo evidence for ischemia.

## 2022-06-05 NOTE — PATIENT PROFILE ADULT - STATED REASON FOR ADMISSION
Patient : Taina Gunter Age: 80year old Sex: female   MRN: [id number] Encounter Date: 12/12/2018    E13/13    History     Chief Complaint   Patient presents with   â¢ Fall     HPI   12/12/2018  11:41 PM Mallika Arguelles is a 80year old female who presents to the ED via EMS seeking evaluation for RLE pain (R knee pain which radiates into R hip and R lower back) and RUE pain that began prior to arrival after pt rolled out of bed and landed onto her R side. She also reports R sided neck pain but denies head trauma or LOC. Pt denies lightheadedness, dizziness, chest pain, abdominal pain, and SOB prior to falling. She further denies chest pain currently. Pt lives at home with her son. No other complaints or modifying factors were reported. PCP: No Pcp      Allergies   Allergen Reactions   â¢ Shellfish Allergy   (Food Or Med) SWELLING     throat   â¢ Dye [Contrast Media]    â¢ Lemon Juice RASH and SWELLING   â¢ Penicillins HIVES   â¢ Sulfa Antibiotics      Reaction and severity not specified. No current facility-administered medications for this encounter. Current Outpatient Medications   Medication Sig   â¢ hydrochlorothiazide (HYDRODIURIL) 50 MG tablet Take 1 tablet by mouth daily. â¢ gabapentin (NEURONTIN) 400 MG capsule Take 400 mg by mouth 2 times daily. â¢ Calcium Carbonate-Vit D-Min (CALTRATE 600+D PLUS) 600-400 MG-UNIT TABS Take 1 tablet by mouth 2 times daily. â¢ lisinopril (ZESTRIL) 20 MG tablet Take 20 mg by mouth daily.          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   â¢ Pneumonia     in childhood       Past Surgical History:   Procedure Laterality Date   â¢ BREAST RECONSTRUCT W TRAM 1 PEDICL  1997    left breast    â¢ FOOT/TOES SURGERY PROC UNLISTED  1992    Unspecified Foot  procedure   â¢ HEAD SURGERY PROC UNLISTED  1991    left craniotomy, meningioma   â¢ KIDNEY SURGERY     â¢ LAPAROSCOPY, NEPHRECTOMY  1971    left kidney   â¢ MASTEC,MOD RADICAL  1997    left Mastectomy (modified radical)   â¢ MASTEC,MOD RADICAL  2001    rifght Mastectomy (modified radical)   â¢ REMOVAL OF OVARY(S)  1994   â¢ TOTAL ABDOM HYSTERECTOMY         Family History   Problem Relation Age of Onset   â¢ Heart Mother         CHF   â¢ Heart disease Father         valve disorder   â¢ Heart Brother    â¢ Neurological Disorder Brother         Parkinson's   â¢ Cancer Brother         throat       Social History     Tobacco Use   â¢ Smoking status: Former Smoker     Last attempt to quit: 1970     Years since quittin.9   â¢ Smokeless tobacco: Never Used   Substance Use Topics   â¢ Alcohol use: No   â¢ Drug use: No       Review of Systems   Constitutional: Negative for activity change, chills and fever. HENT: Negative for congestion and trouble swallowing. Eyes: Negative for discharge and visual disturbance. Respiratory: Negative for cough, chest tightness and shortness of breath. Cardiovascular: Negative for chest pain and leg swelling. Gastrointestinal: Positive for abdominal pain. Negative for constipation, diarrhea, nausea and vomiting. Endocrine: Negative. Genitourinary: Negative for difficulty urinating, dysuria, frequency and urgency. Musculoskeletal: Positive for arthralgias (R knee, R hip), back pain (R, lower) and neck pain (R sided). Positive for RLE pain  Positive for RUE pain   Skin: Negative for color change and rash. Allergic/Immunologic: Negative. Neurological: Negative for dizziness, syncope, weakness, light-headedness and headaches.    Hematological: "Negative. Psychiatric/Behavioral: Negative. All other systems reviewed and are negative. Physical Exam     ED Triage Vitals [12/12/18 2349]   ED Triage Vitals Group      Temp 97.9 Â°F (36.6 Â°C)      Pulse 86      Resp 17      /65      SpO2 98 %      EtCO2 mmHg       Height 5' 4"" (1.626 m)      Weight 149 lb 14.6 oz (68 kg)      Weight Scale Used ED Estimate       Physical Exam   Constitutional: She appears well-developed and well-nourished. HENT:   Head: Normocephalic and atraumatic. Eyes: Conjunctivae are normal.   Neck: Normal range of motion. Cardiovascular: Normal rate, regular rhythm, normal heart sounds and intact distal pulses. Pulmonary/Chest: Effort normal and breath sounds normal. No respiratory distress. She has no wheezes. She has no rales. Abdominal: Soft. She exhibits no distension. There is no tenderness. There is no rebound and no guarding. Musculoskeletal: Normal range of motion. She exhibits no edema. Right shoulder: She exhibits tenderness. She exhibits no deformity. Right elbow: She exhibits no deformity. Tenderness found. Right hip: She exhibits tenderness. She exhibits no deformity. Right knee: She exhibits no deformity. Tenderness found. Cervical back: She exhibits tenderness. Thoracic back: She exhibits tenderness. Lumbar back: She exhibits tenderness. Pt is using all 4 extremities to command. Pain with ROM. Neurological: She is alert. Coordination normal.   Skin: Skin is warm and dry. No rash noted. No erythema. Psychiatric: She has a normal mood and affect. Judgment normal.   Nursing note and vitals reviewed.       ED Course     Procedures    Lab Results     Results for orders placed or performed during the hospital encounter of 12/12/18   CBC & Auto Differential   Result Value Ref Range    WBC 7.4 4.2 - 11.0 K/mcL    RBC 3.02 (L) 4.00 - 5.20 mil/mcL    HGB 10.2 (L) 12.0 - 15.5 g/dL    HCT 30.8 (L) 36.0 - 46.5 " %    .0 (H) 78.0 - 100.0 fl    MCH 33.8 26.0 - 34.0 pg    MCHC 33.1 32.0 - 36.5 g/dL    RDW-CV 14.2 11.0 - 15.0 %     140 - 450 K/mcL    NRBC 0 0 /100 WBC    DIFF TYPE AUTOMATED DIFFERENTIAL     Neutrophil 75 %    LYMPH 12 %    MONO 11 %    EOSIN 2 %    BASO 0 %    Percent Immature Granuloctyes 0 %    Absolute Neutrophil 5.6 1.8 - 7.7 K/mcL    Absolute Lymph 0.9 (L) 1.0 - 4.0 K/mcL    Absolute Mono 0.8 0.3 - 0.9 K/mcL    Absolute Eos 0.1 0.1 - 0.5 K/mcL    Absolute Baso 0.0 0.0 - 0.3 K/mcL    Absolute Immature Granulocytes 0.0 0 - 0.2 K/mcl   Prothrombin Time   Result Value Ref Range    PROTIME 10.8 9.7 - 11.8 sec    INR 1.0    Partial Thromboplastin Time   Result Value Ref Range    PTT 23 22 - 32 sec   Chem 8 Panel - Point of Care   Result Value Ref Range    Sodium  135 - 145 mmol/L    Potassium POC 4.0 3.4 - 5.1 mmol/L    Chloride  98 - 107 mmol/L    CALCIUM IONIZED-POC 1.09 (L) 1.15 - 1.29 mmol/L    CO2 Total 22 19 - 24 mmol/L    GLUCOSE  (H) 65 - 99 mg/dL    BUN POC 15 6 - 20 mg/dL    HEMATOCRIT POC 31.0 (L) 36.0 - 46.5 %    Hemoglobin POC 10.5 (L) 12.0 - 15.5 g/dL    ANION GAP POC 18 mmol/L    Creatinine POC 1.30 (H) 0.51 - 0.95 mg/dL    Estimated GFR  (POC) 43     Estimated GFR Non- (POC) 37    Save for Possible XMatch   Result Value Ref Range    CROSSMATCH  2018    Urinalysis & Reflex Micro with Culture if Indicated   Result Value Ref Range    COLOR YELLOW YELLOW    APPEARANCE CLEAR     GLUCOSE(URINE) NEGATIVE NEGATIVE mg/dL    BILIRUBIN NEGATIVE NEGATIVE    KETONES NEGATIVE NEGATIVE mg/dL    SPECIFIC GRAVITY 1.010 1.005 - 1.030    BLOOD NEGATIVE NEGATIVE    pH 6.0 5.0 - 7.0 Units    PROTEIN(URINE) NEGATIVE NEGATIVE mg/dL    UROBILINOGEN 0.2 0.0 - 1.0 mg/dL    NITRITE NEGATIVE NEGATIVE    LEUKOCYTE ESTERASE NEGATIVE NEGATIVE    SPECIMEN TYPE URINE, CLEAN CATCH/MIDSTREAM        Radiology Results     Imaging Results          XR Elbow 3 View Right (Final result)  Result time 12/13/18 03:36:30    Final result                 Impression:    IMPRESSION:   1. Mild degenerative joint disease of the right acromioclavicular joint and  right glenohumeral joint. 2. Unremarkable right elbow. Narrative:    EXAM:  XR ELBOW 3+ VW RIGHT, XR SHOULDER 3 VW RIGHT    CLINICAL HISTORY: sprain or strain     COMPARISON:  None. TECHNIQUE:  3 views of the right shoulder; 4 views (5 radiographs) of the  right elbow    FINDINGS:    Right shoulder: Moderate right acromioclavicular degenerative joint  disease. The acromiohumeral and coracoclavicular intervals are maintained. Mild right glenohumeral degenerative joint disease, small marginal  osteophytes. Right axillary surgical clips. Visualized ribs, lung  parenchyma, and overlying soft tissues appear unremarkable. Right elbow: No elbow joint effusion. Alignment is normal. Joint spaces are  maintained. No acute fracture. Soft tissues appear unremarkable. XR Knee 3 View Right (Final result)  Result time 12/13/18 03:25:55    Final result                 Impression:    IMPRESSION:    No acute findings. Narrative:    XR KNEE 3 VW RIGHT    HISTORY:  Injury and pain      TECHNIQUE:  Pre- views of the right knee were obtained. COMPARISON: None. FINDINGS:    No fracture or dislocation. No joint effusion. The patellofemoral and  medial joint space narrowing with small osteophyte formation, compatible  with osteoarthritis. Soft tissues are unremarkable. XR Thoracic Spine AP and Lateral (Final result)  Result time 12/13/18 03:33:38    Final result                 Impression:    IMPRESSION:   1. Unremarkable thoracic spine. 2. Multilevel spondylosis of the lumbar spine. No acute fracture or  malalignment of the lumbar spine.                Narrative:    EXAM:  XR LUMBAR SPINE 2 OR 3 VIEWS, XR THORACIC SPINE 2 VW    CLINICAL HISTORY: Fall, pain     COMPARISON:  None. TECHNIQUE:  AP and lateral thoracic spine; 3 views lumbar spine    FINDINGS:  Thoracic spine alignment is normal. Vertebral body and  intervertebral disc space heights are maintained. Visualized ribs appear  unremarkable. Lumbar spine alignment is maintained. 4 lumbar type vertebral bodies are  present. Moderate degenerative disc disease at L1/2. Mild degenerative disc  disease at L2/3 and T12/L1. Mild to moderate bilateral facet arthropathy at L4/S1. Osseous structures  of pelvis appear unremarkable. XR Lumbar Spine 2 or 3 Views (Final result)  Result time 12/13/18 03:33:38    Final result                 Impression:    IMPRESSION:   1. Unremarkable thoracic spine. 2. Multilevel spondylosis of the lumbar spine. No acute fracture or  malalignment of the lumbar spine. Narrative:    EXAM:  XR LUMBAR SPINE 2 OR 3 VIEWS, XR THORACIC SPINE 2 VW    CLINICAL HISTORY: Fall, pain     COMPARISON:  None. TECHNIQUE:  AP and lateral thoracic spine; 3 views lumbar spine    FINDINGS:  Thoracic spine alignment is normal. Vertebral body and  intervertebral disc space heights are maintained. Visualized ribs appear  unremarkable. Lumbar spine alignment is maintained. 4 lumbar type vertebral bodies are  present. Moderate degenerative disc disease at L1/2. Mild degenerative disc  disease at L2/3 and T12/L1. Mild to moderate bilateral facet arthropathy at L4/S1. Osseous structures  of pelvis appear unremarkable. XR Shoulder 3 View Right (Final result)  Result time 12/13/18 03:36:30    Final result                 Impression:    IMPRESSION:   1. Mild degenerative joint disease of the right acromioclavicular joint and  right glenohumeral joint. 2. Unremarkable right elbow.                Narrative:    EXAM:  XR ELBOW 3+ VW RIGHT, XR SHOULDER 3 VW RIGHT    CLINICAL HISTORY: sprain or strain     COMPARISON: None.    TECHNIQUE:  3 views of the right shoulder; 4 views (5 radiographs) of the  right elbow    FINDINGS:    Right shoulder: Moderate right acromioclavicular degenerative joint  disease. The acromiohumeral and coracoclavicular intervals are maintained. Mild right glenohumeral degenerative joint disease, small marginal  osteophytes. Right axillary surgical clips. Visualized ribs, lung  parenchyma, and overlying soft tissues appear unremarkable. Right elbow: No elbow joint effusion. Alignment is normal. Joint spaces are  maintained. No acute fracture. Soft tissues appear unremarkable. XR Hip 2-3 View RIGHT and Pelvis (Final result)  Result time 12/13/18 03:24:37    Final result                 Impression:    IMPRESSION:     No acute osseous findings. Narrative:    XR HIP 2-3 VIEW RIGHT AND PELVIS    HISTORY: Hip injury and pain. TECHNIQUE: 2 views of the right hip. Single AP view of the pelvis. COMPARISON: None. FINDINGS:     The bony pelvis is intact. No evidence of pelvic fracture. Moderate  degenerative changes of the sacroiliac joints, right greater than left. Moderate degenerative changes also noted in the lower lumbar spine. Mild  left hip joint space narrowing. No evidence of right hip fracture. Mild joint space narrowing and small  osteophyte formation compatible with mild right hip osteoarthritis. Soft  tissues are unremarkable. XR CHEST AP OR PA (Final result)  Result time 12/13/18 03:22:11   Procedure changed from XR Chest PA and Lateral     Final result                 Impression:    IMPRESSION:      No acute cardiopulmonary findings. Narrative:    XR CHEST AP OR PA    HISTORY:  Injury, fall     COMPARISON:  August 8, 2018    TECHNIQUE:  1 frontal view submitted for interpretation.     FINDINGS:      The cardiac silhouette is enlarged and similar to prior exam. Pulmonary  vessels are normally distributed. No focal pulmonary consolidation. No  pleural effusion. No pneumothorax. Multiple surgical clips project over the right axilla and left lateral  chest wall. CT Head Brain (Final result)  Result time 12/13/18 01:18:45    Final result                 Impression:    IMPRESSION:    1. Acute subdural hematoma overlying the left cerebral convexity, most  prominent in the left frontal region. Moderate mass effect upon the  adjacent left frontal lobe. No midline shift. No intraparenchymal or  infratentorial hemorrhage. 2. Unchanged, indeterminate low attenuating focus within the right  cerebellar hemisphere. This could be further evaluated with dedicated MRI. 3. No evidence of acute cervical spine fracture. 4. Multilevel degenerative changes throughout the cervical spine, as  described. Mild widening of the C5-C6 interspinous distance, favored  degenerative. Findings were discussed by Dr. Kelly Clay with the patient's Physician  Monica Becerril at 12/13/2018 1:14 AM.                Narrative:      CT HEAD WO CONTRAST, CT CERVICAL SPINE WO CONTRAST    HISTORY:  Head and neck injury. COMPARISON:  CT head September 7, 2018. No prior CT cervical spine for  comparison. TECHNIQUE:  Multiple axial images were obtained through the head without  intravenous contrast. Multiple axial images of the cervical spine were  obtained from the skull base through the lung apices without intravenous  contrast. Sagittal and coronal reformatted images were performed by the  technologist and submitted for review. FINDINGS:    HEAD:  Acute, subdural hematoma overlies the left cerebral convexity and measures  up to a maximum thickness of 1.5 cm along the left frontal lobe. Mixed  attenuation blood products within the more posterior aspect of the subdural  hematoma may reflect acute on chronic etiology. There is moderate mass  effect upon the adjacent left frontal lobe.  No significant midline shift. No intraparenchymal or infratentorial hemorrhage. Stable postoperative changes from left high frontal/parietal craniotomy. Peripherally calcified fluid collection in the left parietal lobe with  surrounding encephalomalacia is not significantly changed. Gray-white interface is maintained. No evidence for acute, large vascular  territorial infarct. Low attenuating focus within the medial aspect of the  right cerebellar hemisphere is similar to prior exam.    No acute osseous findings. Visualized paranasal sinuses and mastoid air  cells are clear. CERVICAL SPINE:    Reversal of the normal cervical lordosis, centered at C5-C7. Minimal  anterolisthesis C4-C5 and C5-C6 with minimal retrolisthesis C6-C7. Vertebral body heights are maintained. Multilevel disc space narrowing with  anterior osteophyte formation, most significant at C6-C7. There is mild  widening of the C5-C6 interspinous distance, likely degenerative. No  evidence of acute fracture. The craniocervical junction is unremarkable. The spinal canal appears grossly patent. Prevertebral soft tissues are unremarkable. The airway is maintained. Moderate pleural-based scarring within the lung apices. CT Cervical Spine (Final result)  Result time 12/13/18 01:18:45    Final result                 Impression:    IMPRESSION:    1. Acute subdural hematoma overlying the left cerebral convexity, most  prominent in the left frontal region. Moderate mass effect upon the  adjacent left frontal lobe. No midline shift. No intraparenchymal or  infratentorial hemorrhage. 2. Unchanged, indeterminate low attenuating focus within the right  cerebellar hemisphere. This could be further evaluated with dedicated MRI. 3. No evidence of acute cervical spine fracture. 4. Multilevel degenerative changes throughout the cervical spine, as  described.  Mild widening of the C5-C6 interspinous distance, favored  degenerative. Findings were discussed by Dr. Reginaldo Ramos with the patient's Physician  Bryan Verdugo at 12/13/2018 1:14 AM.                Narrative:      CT HEAD WO CONTRAST, CT CERVICAL SPINE WO CONTRAST    HISTORY:  Head and neck injury. COMPARISON:  CT head September 7, 2018. No prior CT cervical spine for  comparison. TECHNIQUE:  Multiple axial images were obtained through the head without  intravenous contrast. Multiple axial images of the cervical spine were  obtained from the skull base through the lung apices without intravenous  contrast. Sagittal and coronal reformatted images were performed by the  technologist and submitted for review. FINDINGS:    HEAD:  Acute, subdural hematoma overlies the left cerebral convexity and measures  up to a maximum thickness of 1.5 cm along the left frontal lobe. Mixed  attenuation blood products within the more posterior aspect of the subdural  hematoma may reflect acute on chronic etiology. There is moderate mass  effect upon the adjacent left frontal lobe. No significant midline shift. No intraparenchymal or infratentorial hemorrhage. Stable postoperative changes from left high frontal/parietal craniotomy. Peripherally calcified fluid collection in the left parietal lobe with  surrounding encephalomalacia is not significantly changed. Gray-white interface is maintained. No evidence for acute, large vascular  territorial infarct. Low attenuating focus within the medial aspect of the  right cerebellar hemisphere is similar to prior exam.    No acute osseous findings. Visualized paranasal sinuses and mastoid air  cells are clear. CERVICAL SPINE:    Reversal of the normal cervical lordosis, centered at C5-C7. Minimal  anterolisthesis C4-C5 and C5-C6 with minimal retrolisthesis C6-C7. Vertebral body heights are maintained. Multilevel disc space narrowing with  anterior osteophyte formation, most significant at C6-C7.  There is "mild  widening of the C5-C6 interspinous distance, likely degenerative. No  evidence of acute fracture. The craniocervical junction is unremarkable. The spinal canal appears grossly patent. Prevertebral soft tissues are unremarkable. The airway is maintained. Moderate pleural-based scarring within the lung apices. ED Medication Orders (From admission, onward)    Start Ordered     Status Ordering Provider    12/13/18 0136 12/13/18 0135  levETIRAcetam (KepPRA) in sodium chloride premix IVPB 1,500 mg  ONCE      Last MAR action:  Completed JOHN NAVARRO    12/13/18 0136 12/13/18 0135  fentaNYL (SUBLIMAZE) injection 25 mcg  ONCE      Last MAR action:  Given JOHN NAVARRO    12/12/18 2346 12/12/18 2345  acetaminophen (TYLENOL) tablet 650 mg  ONCE      Last MAR action:  Given Camilo Ardon               [facility]  Visit Vitals  Vitals:    12/12/18 2349 12/13/18 0130 12/13/18 0202 12/13/18 0353   BP: 117/65 145/67 137/68 (!) 133/99   Pulse: 86      Resp: 17      Temp: 97.9 Â°F (36.6 Â°C)      TempSrc: Axillary      SpO2: 98% 96% 95%    Weight: 68 kg      Height: 5' 4"" (1.626 m)              ED Course    Initial encounter: 80year old female presents to the ED seeking evaluation for generalized body pain that began after pt rolled out of bed and landed onto her R side. On exam, tenderness to R shoulder, R elbow, R hip, and R knee is noted. Discussed the plan for assessment with imaging, including XR R shoulder, elbow, knee, hip, and L/T spine. Plan to administer Tylenol for pain control. Pt is agreeable to the plan of care. 1:15 AM I spoke with radiology regarding pt's head CT; pt has an acute subdural hematoma. 1:38 AM I spoke with Ian Smith ([facility]) and we discussed the pt's presentation and workup in the ED. He was informed that pt's head CT revealed subdural hematoma. 1:39 AM Patient Recheck: Pt is resting comfortably in bed.  Discussed her labs and workup which revealed that pt has a " subdural hematoma. We further discussed the plan for further care and monitoring at [facility], and she is agreeable. Pt reports continued HA and neck pain at this time. 1:46 AM Consult: I spoke with Dr. Bay Diaz, Neuro ICU [facility], regarding the patient's hx, presentation, and the ED work up which revealed that pt has acute subdural hematoma. He was informed that pt was given Keppra in the ED and her imaging is pending. He agrees with the plan to continue care and evaluation beyond the ED at [facility] as long as pt's remaining imaging is unremarkable. 3:38 AM Patient Recheck: Pt is resting comfortably in bed. Discussed her imaging thus far is negative for fx or other osseous abnormalities. I will update her once her elbow and shoulder XR's have resulted. She is agreeable for straight cath as she cannot provide a urine sample at this time. She is agreeable. 3:41 AM I spoke with Lisette Rice ([facility]) and updated him that pt's remaining imaging is unremarkable. MAYRA Leone is a 80year old female who presents to the ED via EMS seeking evaluation for RLE pain (R knee pain which radiates into R hip and R lower back) and RUE pain that began prior to arrival after pt rolled out of bed and landed onto her R side. Patient's fall appears mechanical in nature. No preceding or current CP, palpitations, SOB, weakness to suggest ACS, cardiac arrhythmia, dissection, AAA, PE, TIA, CVA. No recent infectious signs/symtpoms. Therefore, no need for labs. She is uncertain if she hit her head but complains of neck, back, right arm, and right leg pain. Therefore, she was placed in a C-collar. CT head/neck in additional to xrays ordered and reviewed: CT head shows acute subdural hematoma with moderate mass effect, no midline shift. CT C-spine negative for acute pathology. Xrays of the shoulder, elbow, hip, knee, T-/L-spine negative. Lab and imaging results discussed with patient.  On re-evaluation, patient continues to have full body pain but remains neurologically intact. She was loaded with Keppra to prevent seizures. Labs reviewed: coags WNL. CBC shows mild anemia but is otherwise unremarkable. BMP unremarkable. I spoke with Dr. Bay Diaz (neuro ICU at [facility]) who accepts patient in transfer. Will consult trauma at their facility. Patient in agreement with plan. Critical Care time spent on this patient outside of billable procedures:  38 mintes      Clinical Impression    ED Diagnoses        Final diagnoses    SDH (subdural hematoma) (CMS/[facility])          Injury of head, initial encounter          Fall from bed, initial encounter          Neck pain          Acute midline thoracic back pain          Lumbar back pain          Acute pain of right shoulder          Right elbow pain          Right hip pain          Acute pain of right knee                Pt is transferred to Dr. Bay Diaz at [facility] in serious condition. I have reviewed the information recorded by the scribe for accuracy and agree with its contents.     lAon Johnson acting as scribe for Sheila Jennings MD  Physician: Dr. Elsie Stoll  Physician #:  [id number]  Scribe: Brigitte Mclaughlin MD  12/13/18 [id number] Pt states"low pulse."

## 2022-06-05 NOTE — ED PROVIDER NOTE - CARE PLAN
1 Principal Discharge DX:	Symptomatic bradycardia  Secondary Diagnosis:	New onset atrial flutter  Secondary Diagnosis:	Bradycardia with 31-40 beats per minute

## 2022-06-05 NOTE — ED PROVIDER NOTE - PROGRESS NOTE DETAILS
C MD Bill:  cardiac monitor + alfredo down to 35 bpm, BP stable, pt alert, Neuro non-focal, no cp/SOB.  Case d/w Dr Sierra, incl. EKG showing A Flutter with slow VR (pt with no known AF hx): advises CICU admission, IV heparin, EP consult; he will inform Dr Haines of case for inpt f/u tomorrow.  Paging EPS. DONNA Khan MD:  case d/w Dr. Kam/EPS: agrees with CICU adm., full dose heparin, will evaluate pt in AM for possible ppm. DONNA Khan MD:  Dr. Cervantes aware of CICU admission. DONNA Khan MD:  cardiac monitor + alfredo down to 35 bpm, BP stable, pt alert, Neuro non-focal, no cp/SOB.  Case d/w Dr Sierra (covering Dr. OMAR Haines, pt's Cardio MD), incl. EKG showing A Flutter with slow VR (pt with no known AF hx): advises CICU admission, IV heparin, EP consult; he will inform Dr Haines of case for inpt f/u tomorrow.  Paging EPS.

## 2022-06-05 NOTE — H&P ADULT - HISTORY OF PRESENT ILLNESS
86 year old male patient with pertinent past medical history noted for afib/flutter and CAD presented to the ED complaining of profound fatigue in the setting of bradycardia. Of note, patient was previously seen and evaluated for similar presentation and had his home dose of Propanolol halved. States his heart rate remained persistently low despite not take propanolol for the past two days. Heart rate ranged from 34-42 on ambulatory monitoring. Patient without associated chest pain, palpitations, shortness of breath, dizziness, nausea or vomiting.        On telemetry monitoring in the ED patient with heart range typically in the 40s but occasionally dipped to the 30s.

## 2022-06-05 NOTE — ED ADULT TRIAGE NOTE - CHIEF COMPLAINT QUOTE
pt presents to ED due to low pulse pt states he was seen in  ED last week for the same complaints had his meds adjusted and was being monitored by his cardiologist HR 48 in triage

## 2022-06-05 NOTE — ED ADULT NURSE NOTE - OBJECTIVE STATEMENT
pt presents to ED from home. A&O x4. c/o bradycardia. noted low HR a few weeks ago and was seen in ED. being followed by cardiologist Yancy and just finished wearing holter monitor for 2 weeks. did not note episode of bradycardia on monitor. no pacemaker. decreased dose of propanolol to 20mg BID. last took yesterday morning. pt noted HR 40s at home while monitoring BP today and comes to ED for evaluation. states dizziness and lightheadedness. denies syncope, CP, SOB. ekg performed. cardiac, BP, SpO22 monitoring in place. cardiac pads in place. IV access established. 20g L AC. MD Contino at bedside.

## 2022-06-05 NOTE — ED PROVIDER NOTE - CLINICAL SUMMARY MEDICAL DECISION MAKING FREE TEXT BOX
85 yo WM, h/o hypothyroid, HTN, HLD, CAD, ambulatory to ED c/o'ing lightheaded, home monitor + bradycardia into low 40s.  Pt s/p admission HH mid-May '22 for symptomatic sinus bradycardia: Propranolol held during stay, D/C'ed on outpt Holter monitor X 2+ weeks & restarted by Dr. Haines on 1/2 dose propnalol (10 mg bid); Holter returned to office 6/3.  Pt reports felt more tired, lightheaded starting last tara, home monitor + alfredo into low 40s.  Monitor + brday down to 35 bpm, + AFlutter with slow VR.  Plan: EKG, CXR, labs incvl. BNP, serial Troponin, coags, d/w Cardio. 87 yo WM, h/o hypothyroid, HTN, HLD, CAD, ambulatory to ED c/o'ing lightheaded, home monitor + bradycardia into low 40s.  Pt s/p admission HH mid-May '22 for symptomatic sinus bradycardia: Propranolol held during stay, D/C'ed on outpt Holter monitor X 2+ weeks & restarted by Dr. Haines on 1/2 dose propnalol (10 mg bid); Holter returned to office 6/3.  Pt reports felt more tired, lightheaded starting last tara, home monitor + alfredo into low 40s.  Monitor + alfredo down to 35 bpm, + AFlutter with slow VR.  Plan: EKG, CXR, labs incvl. BNP, serial Troponin, coags, d/w Cardio.

## 2022-06-05 NOTE — H&P ADULT - NSHPPHYSICALEXAM_GEN_ALL_CORE
Vital Signs Last 24 Hrs  T(C): 37.1 (05 Jun 2022 19:22), Max: 37.1 (05 Jun 2022 19:22)  T(F): 98.7 (05 Jun 2022 19:22), Max: 98.7 (05 Jun 2022 19:22)  HR: 41 (05 Jun 2022 22:00) (38 - 49)  BP: 142/70 (05 Jun 2022 22:00) (136/68 - 158/80)  BP(mean): 87 (05 Jun 2022 22:00) (86 - 104)  RR: 14 (05 Jun 2022 22:00) (13 - 20)  SpO2: 100% (05 Jun 2022 22:00) (96% - 100%)

## 2022-06-05 NOTE — ED PROVIDER NOTE - CONSTITUTIONAL, MLM
normal... Well appearing, awake, alert, oriented to person, place, time/situation and in no apparent distress. Well appearing elderly WM, awake, alert, oriented to person, place, time/situation and in no apparent distress.

## 2022-06-06 DIAGNOSIS — I48.92 UNSPECIFIED ATRIAL FLUTTER: ICD-10-CM

## 2022-06-06 DIAGNOSIS — R00.1 BRADYCARDIA, UNSPECIFIED: ICD-10-CM

## 2022-06-06 DIAGNOSIS — I10 ESSENTIAL (PRIMARY) HYPERTENSION: ICD-10-CM

## 2022-06-06 LAB
ALBUMIN SERPL ELPH-MCNC: 3.1 G/DL — LOW (ref 3.3–5)
ALP SERPL-CCNC: 69 U/L — SIGNIFICANT CHANGE UP (ref 40–120)
ALT FLD-CCNC: 22 U/L — SIGNIFICANT CHANGE UP (ref 12–78)
ANION GAP SERPL CALC-SCNC: 6 MMOL/L — SIGNIFICANT CHANGE UP (ref 5–17)
APTT BLD: 129.4 SEC — CRITICAL HIGH (ref 27.5–35.5)
APTT BLD: 54.6 SEC — HIGH (ref 27.5–35.5)
APTT BLD: 64.8 SEC — HIGH (ref 27.5–35.5)
AST SERPL-CCNC: 16 U/L — SIGNIFICANT CHANGE UP (ref 15–37)
BASOPHILS # BLD AUTO: 0.03 K/UL — SIGNIFICANT CHANGE UP (ref 0–0.2)
BASOPHILS NFR BLD AUTO: 0.4 % — SIGNIFICANT CHANGE UP (ref 0–2)
BILIRUB SERPL-MCNC: 0.8 MG/DL — SIGNIFICANT CHANGE UP (ref 0.2–1.2)
BUN SERPL-MCNC: 25 MG/DL — HIGH (ref 7–23)
CALCIUM SERPL-MCNC: 9 MG/DL — SIGNIFICANT CHANGE UP (ref 8.5–10.1)
CHLORIDE SERPL-SCNC: 108 MMOL/L — SIGNIFICANT CHANGE UP (ref 96–108)
CO2 SERPL-SCNC: 26 MMOL/L — SIGNIFICANT CHANGE UP (ref 22–31)
CREAT SERPL-MCNC: 1.11 MG/DL — SIGNIFICANT CHANGE UP (ref 0.5–1.3)
EGFR: 65 ML/MIN/1.73M2 — SIGNIFICANT CHANGE UP
EOSINOPHIL # BLD AUTO: 0.14 K/UL — SIGNIFICANT CHANGE UP (ref 0–0.5)
EOSINOPHIL NFR BLD AUTO: 2 % — SIGNIFICANT CHANGE UP (ref 0–6)
GLUCOSE SERPL-MCNC: 131 MG/DL — HIGH (ref 70–99)
HCT VFR BLD CALC: 41.8 % — SIGNIFICANT CHANGE UP (ref 39–50)
HGB BLD-MCNC: 13.9 G/DL — SIGNIFICANT CHANGE UP (ref 13–17)
IMM GRANULOCYTES NFR BLD AUTO: 0.3 % — SIGNIFICANT CHANGE UP (ref 0–1.5)
LYMPHOCYTES # BLD AUTO: 2.01 K/UL — SIGNIFICANT CHANGE UP (ref 1–3.3)
LYMPHOCYTES # BLD AUTO: 28.3 % — SIGNIFICANT CHANGE UP (ref 13–44)
MAGNESIUM SERPL-MCNC: 2 MG/DL — SIGNIFICANT CHANGE UP (ref 1.6–2.6)
MCHC RBC-ENTMCNC: 29.1 PG — SIGNIFICANT CHANGE UP (ref 27–34)
MCHC RBC-ENTMCNC: 33.3 GM/DL — SIGNIFICANT CHANGE UP (ref 32–36)
MCV RBC AUTO: 87.4 FL — SIGNIFICANT CHANGE UP (ref 80–100)
MONOCYTES # BLD AUTO: 0.6 K/UL — SIGNIFICANT CHANGE UP (ref 0–0.9)
MONOCYTES NFR BLD AUTO: 8.5 % — SIGNIFICANT CHANGE UP (ref 2–14)
NEUTROPHILS # BLD AUTO: 4.3 K/UL — SIGNIFICANT CHANGE UP (ref 1.8–7.4)
NEUTROPHILS NFR BLD AUTO: 60.5 % — SIGNIFICANT CHANGE UP (ref 43–77)
PHOSPHATE SERPL-MCNC: 3.3 MG/DL — SIGNIFICANT CHANGE UP (ref 2.5–4.5)
PLATELET # BLD AUTO: 170 K/UL — SIGNIFICANT CHANGE UP (ref 150–400)
POTASSIUM SERPL-MCNC: 3.9 MMOL/L — SIGNIFICANT CHANGE UP (ref 3.5–5.3)
POTASSIUM SERPL-SCNC: 3.9 MMOL/L — SIGNIFICANT CHANGE UP (ref 3.5–5.3)
PROT SERPL-MCNC: 5.8 GM/DL — LOW (ref 6–8.3)
RBC # BLD: 4.78 M/UL — SIGNIFICANT CHANGE UP (ref 4.2–5.8)
RBC # FLD: 13.9 % — SIGNIFICANT CHANGE UP (ref 10.3–14.5)
SODIUM SERPL-SCNC: 140 MMOL/L — SIGNIFICANT CHANGE UP (ref 135–145)
TSH SERPL-MCNC: 2.76 UU/ML — SIGNIFICANT CHANGE UP (ref 0.34–4.82)
WBC # BLD: 7.1 K/UL — SIGNIFICANT CHANGE UP (ref 3.8–10.5)
WBC # FLD AUTO: 7.1 K/UL — SIGNIFICANT CHANGE UP (ref 3.8–10.5)

## 2022-06-06 PROCEDURE — 99233 SBSQ HOSP IP/OBS HIGH 50: CPT

## 2022-06-06 PROCEDURE — 93306 TTE W/DOPPLER COMPLETE: CPT | Mod: 26

## 2022-06-06 RX ORDER — ZOLPIDEM TARTRATE 10 MG/1
1 TABLET ORAL
Qty: 0 | Refills: 0 | DISCHARGE

## 2022-06-06 RX ORDER — MILK THISTLE 150 MG
1 CAPSULE ORAL
Qty: 0 | Refills: 0 | DISCHARGE

## 2022-06-06 RX ADMIN — ATORVASTATIN CALCIUM 20 MILLIGRAM(S): 80 TABLET, FILM COATED ORAL at 21:14

## 2022-06-06 RX ADMIN — PANTOPRAZOLE SODIUM 40 MILLIGRAM(S): 20 TABLET, DELAYED RELEASE ORAL at 10:47

## 2022-06-06 RX ADMIN — HEPARIN SODIUM 1100 UNIT(S)/HR: 5000 INJECTION INTRAVENOUS; SUBCUTANEOUS at 12:03

## 2022-06-06 RX ADMIN — Medication 1000 UNIT(S): at 10:48

## 2022-06-06 RX ADMIN — HEPARIN SODIUM 1100 UNIT(S)/HR: 5000 INJECTION INTRAVENOUS; SUBCUTANEOUS at 19:08

## 2022-06-06 RX ADMIN — Medication 3 MILLIGRAM(S): at 00:09

## 2022-06-06 RX ADMIN — ZOLPIDEM TARTRATE 5 MILLIGRAM(S): 10 TABLET ORAL at 21:15

## 2022-06-06 RX ADMIN — HEPARIN SODIUM 1400 UNIT(S)/HR: 5000 INJECTION INTRAVENOUS; SUBCUTANEOUS at 07:15

## 2022-06-06 RX ADMIN — Medication 200 MILLIGRAM(S): at 10:48

## 2022-06-06 RX ADMIN — HEPARIN SODIUM 1100 UNIT(S)/HR: 5000 INJECTION INTRAVENOUS; SUBCUTANEOUS at 08:27

## 2022-06-06 RX ADMIN — HEPARIN SODIUM 3500 UNIT(S): 5000 INJECTION INTRAVENOUS; SUBCUTANEOUS at 21:54

## 2022-06-06 RX ADMIN — HEPARIN SODIUM 0 UNIT(S)/HR: 5000 INJECTION INTRAVENOUS; SUBCUTANEOUS at 07:23

## 2022-06-06 RX ADMIN — LOSARTAN POTASSIUM 100 MILLIGRAM(S): 100 TABLET, FILM COATED ORAL at 10:48

## 2022-06-06 RX ADMIN — HEPARIN SODIUM 1100 UNIT(S)/HR: 5000 INJECTION INTRAVENOUS; SUBCUTANEOUS at 14:53

## 2022-06-06 RX ADMIN — Medication 75 MICROGRAM(S): at 05:10

## 2022-06-06 RX ADMIN — AMLODIPINE BESYLATE 5 MILLIGRAM(S): 2.5 TABLET ORAL at 12:02

## 2022-06-06 RX ADMIN — Medication 1 TABLET(S): at 10:47

## 2022-06-06 RX ADMIN — HEPARIN SODIUM 1300 UNIT(S)/HR: 5000 INJECTION INTRAVENOUS; SUBCUTANEOUS at 21:52

## 2022-06-06 NOTE — PHYSICAL THERAPY INITIAL EVALUATION ADULT - PERTINENT HX OF CURRENT PROBLEM, REHAB EVAL
86 year old male patient with pertinent past medical history noted for afib/flutter and CAD presented to the ED complaining of profound fatigue in the setting of bradycardia.

## 2022-06-06 NOTE — PROGRESS NOTE ADULT - SUBJECTIVE AND OBJECTIVE BOX
Patient is a 86y old  Male who presents with a chief complaint of Bradycardia (06 Jun 2022 10:45)    HPI: 85yo male with PMHx HTN, HLD, CAD, Hypothyroid presents with lightheadedness, fatigue and low HR (40s) taken at home. Pt states that he has been experiencing lightheadedness and fatigue for weeks with improvement or worsening of sxs. Reports that he checked his BP yesterday and came to the ED because it was in the 40s. He recently was seen by cardiologist Dr. Haines 2 weeks ago for bradycardia and propanolol dose was decreased by 1/2. Pt states he last took his propanolol on 6/4. Denies HA, syncope, falls, chest pain, SOB, recent travel, known exposure to ticks.    Events last 24 hours: Pt assessed at bedside. Comfortable in bed, denies lightheadedness, dizziness, chest pain, palpitations, SOB. tele monitor showing Aflutter with slow ventricular rate (40- 50s)    PAST MEDICAL & SURGICAL HISTORY:  Essential tremor  Benign prostatic hypertrophy  Hypertension  Hypercholesterolemia  Hypothyroid  Hemorrhoids  S/P tonsillectomy  History of coronary angiogram 1997  History of cataract surgery, right    Medications:  amLODIPine   Tablet 5 milliGRAM(s) Oral daily  losartan 100 milliGRAM(s) Oral daily  acetaminophen     Tablet .. 650 milliGRAM(s) Oral every 6 hours PRN  melatonin 3 milliGRAM(s) Oral at bedtime PRN  ondansetron Injectable 4 milliGRAM(s) IV Push every 8 hours PRN  zolpidem 5 milliGRAM(s) Oral at bedtime PRN  zolpidem 5 milliGRAM(s) Oral at bedtime PRN  heparin   Injectable 7500 Unit(s) IV Push every 6 hours PRN  heparin   Injectable 3500 Unit(s) IV Push every 6 hours PRN  heparin  Infusion.  Unit(s)/Hr IV Continuous <Continuous>  aluminum hydroxide/magnesium hydroxide/simethicone Suspension 30 milliLiter(s) Oral every 4 hours PRN  atropine Injectable 0.5 milliGRAM(s) IntraMuscular once PRN  pantoprazole    Tablet 40 milliGRAM(s) Oral before breakfast  allopurinol  Oral Tab/Cap - Peds 200 milliGRAM(s) Oral daily  atorvastatin 20 milliGRAM(s) Oral at bedtime  levothyroxine 75 MICROGram(s) Oral daily  cholecalciferol 1000 Unit(s) Oral daily  multivitamin 1 Tablet(s) Oral daily    ICU Vital Signs Last 24 Hrs  T(C): 36.7 (06 Jun 2022 15:02), Max: 37.1 (05 Jun 2022 19:22)  T(F): 98 (06 Jun 2022 15:02), Max: 98.7 (05 Jun 2022 19:22)  HR: 53 (06 Jun 2022 14:00) (38 - 64)  BP: 144/64 (06 Jun 2022 14:00) (121/59 - 156/77)  BP(mean): 86 (06 Jun 2022 14:00) (73 - 100)  ABP: --  ABP(mean): --  RR: 14 (06 Jun 2022 14:00) (12 - 26)  SpO2: 100% (06 Jun 2022 14:00) (94% - 100%)    I&O's Detail    05 Jun 2022 07:01  -  06 Jun 2022 07:00  --------------------------------------------------------  IN:    Heparin Infusion: 118 mL  Total IN: 118 mL    OUT:    Voided (mL): 450 mL  Total OUT: 450 mL    Total NET: -332 mL    LABS:                        13.9   7.10  )-----------( 170      ( 06 Jun 2022 05:43 )             41.8     06-06    140  |  108  |  25<H>  ----------------------------<  131<H>  3.9   |  26  |  1.11    Ca    9.0      06 Jun 2022 05:43  Phos  3.3     06-06  Mg     2.0     06-06    TPro  5.8<L>  /  Alb  3.1<L>  /  TBili  0.8  /  DBili  x   /  AST  16  /  ALT  22  /  AlkPhos  69  06-06    PT/INR - ( 06 Jun 2022 05:43 )   PT: 12.7 sec;   INR: 1.09 ratio    PTT - ( 06 Jun 2022 14:19 )  PTT:64.8 sec    Physical Examination:    General: No acute distress.  awake and alert. pleasant  HEENT: Pupils equal, reactive to light.  Symmetric. EOMIs b/l  PULM: Clear to auscultation bilaterally, no wheezing  NECK: Supple, no lymphadenopathy, trachea midline  CVS: Bradycardia, irregular rhythm, no murmurs,   ABD: Soft, nondistended, nontender, normoactive bowel sounds,  EXT: No edema, nontender  SKIN: Warm and well perfused, no rashes noted.  NEURO: Alert, oriented, interactive, no focal deficits. CN 2-12 grossly intact

## 2022-06-06 NOTE — PHYSICAL THERAPY INITIAL EVALUATION ADULT - ACTIVE RANGE OF MOTION EXAMINATION, REHAB EVAL
bilateral upper extremity Active ROM was WFL (within functional limits)/bilateral  lower extremity Active ROM was WFL (within functional limits)
17-Jul-2019 14:40

## 2022-06-06 NOTE — CONSULT NOTE ADULT - ASSESSMENT
6/6/22:  Pt with above history and symptomatic bradycardia now with A. Flutter with slow VR (Ventricular Response) off low dose Propranolol.  Will need a PPM and may need to consider RENE cardioversion after.  Will have EP seen the pt.  He did well on Propranolol both for hs BP and essential tremors but will need to hold that for now.  Agree with Heparin and will ultimately need Eliquis and consider stopping the Plavix.  Continue as outlined by medicine and EP etal.  Will follow.
ASSESSMENT & PLAN: 86 year old male with history of Aflutter, CAD, essential tremor previously on Propranolol but discontinued due to bradycardia who presented to ED with symptomatic bradycardia.       Patient with sick sinus syndrome and would benefit from pacemaker  I/R/B/A of pacemaker discussed with patient with verbalized understanding  Consent signed on chart  Follow up echo results  Continue Heparin gtt, discontinue at 6AM on 6/7, can start OAC 24 hours post op  NPO after MN  Plan discussed with patient, RN, hospitalist and Dr. Kam

## 2022-06-06 NOTE — PHYSICAL THERAPY INITIAL EVALUATION ADULT - GENERAL OBSERVATIONS, REHAB EVAL
Pt seen for 30min PT bedside Eval. Pt rec'd semi supine in bed in NAD in CICU, Hold OOB as pt bradycardia into 30s at rest, awaitign PPM tomorrow as per RN. Pt willing to participate in bedisde eval. History taken, pt ROM WFL, and strength grossly 3+/5 throughout. Pt left semi supine in bed in NAD, all needs met, +bed alarm, RN aware, wife bedside.

## 2022-06-06 NOTE — PROGRESS NOTE ADULT - SUBJECTIVE AND OBJECTIVE BOX
CODE STATUS: tbd  Hospital day # 1    chief complaint: 86 year old male patient with pertinent past medical history noted for afib/flutter and CAD presented to the ED complaining of profound fatigue in the setting of bradycardia. Of note, patient was previously seen and evaluated for similar presentation and had his home dose of Propanolol halved. States his heart rate remained persistently low despite not take propanolol for the past two days. Heart rate ranged from 34-42 on ambulatory monitoring. Adm for further evaluation. No c/o at this time    Vital Signs Last 24 Hrs  T(C): 35.8 (06 Jun 2022 05:00), Max: 37.1 (05 Jun 2022 19:22)  T(F): 96.4 (06 Jun 2022 05:00), Max: 98.7 (05 Jun 2022 19:22)  HR: 47 (06 Jun 2022 10:00) (38 - 64)  BP: 150/62 (06 Jun 2022 10:00) (121/59 - 158/80)  BP(mean): 84 (06 Jun 2022 10:00) (73 - 104)  RR: 13 (06 Jun 2022 10:00) (12 - 20)  SpO2: 96% (06 Jun 2022 10:00) (94% - 100%)      Gen not in acute distress  HEENT nc at perrla  Neck supple no JVD  Chest CTA  CVS s1s2 reg, no m/g/r  Abd soft nt/nd + BS   voiding  Neuro non focal mot str 5/5 all extr  Skin no rash  Musculoskeletal FROM    Home Medications:  allopurinol 100 mg oral tablet: 2 tab(s) orally once a day (06 Jun 2022 09:42)  ALPRAZolam 1 mg oral tablet: 1 tab(s) orally every 8 hours, As Needed (06 Jun 2022 09:42)  amLODIPine 5 mg oral tablet: 1 tab(s) orally once a day (06 Jun 2022 09:42)  Co-Q10 100 mg oral capsule: 1 cap(s) orally once a day (06 Jun 2022 09:42)  Fish Oil 1000 mg oral capsule: 1 cap(s) orally once a day (06 Jun 2022 09:42)  levothyroxine 75 mcg (0.075 mg) oral tablet: 1 tab(s) orally once a day (06 Jun 2022 09:42)  Lipitor 20 mg oral tablet: 1 tab(s) orally once a day (at bedtime) (06 Jun 2022 09:42)  Multiple Vitamins oral tablet: 1 tab(s) orally once a day (06 Jun 2022 09:42)  olmesartan 40 mg oral tablet: 1 tab(s) orally once a day (06 Jun 2022 09:42)  pantoprazole 40 mg oral delayed release tablet: 1 tab(s) orally once a day (06 Jun 2022 09:42)  Plavix 75 mg oral tablet: 1 tab(s) orally once a day (06 Jun 2022 09:42)  Vitamin D3 25 mcg (1000 intl units) oral tablet: 1 tab(s) orally once a day (06 Jun 2022 09:42)  Voltaren Arthritis Pain 1% topical gel: Apply topically to knees as needed for pain (06 Jun 2022 09:42)      Labs:                           13.9   7.10  )-----------( 170      ( 06 Jun 2022 05:43 )             41.8       TTE:    12 Lead ECG (05.18.22 @ 17:35) >   Sinus bradycardia with 2nd degree type 1 AV block  Left axis deviation          RADIOLOGY:     06-06    140  |  108  |  25<H>  ----------------------------<  131<H>  3.9   |  26  |  1.11    Ca    9.0      06 Jun 2022 05:43  Phos  3.3     06-06  Mg     2.0     06-06    TPro  5.8<L>  /  Alb  3.1<L>  /  TBili  0.8  /  DBili  x   /  AST  16  /  ALT  22  /  AlkPhos  69  06-06      A/P:    * Bradycardia  h/o AFL  eval for PPM  will c/u UFH drip in case DCC will also be attempted  asymptomatic          Dispo:

## 2022-06-06 NOTE — PROVIDER CONTACT NOTE (OTHER) - ACTION/TREATMENT ORDERED:
Call Surgical Resident for further input.  Keep NP aware if stoma increases in size/bleeding worsens.
Will come to unit to assess site.

## 2022-06-06 NOTE — CONSULT NOTE ADULT - SUBJECTIVE AND OBJECTIVE BOX
HPI:  86 year old male patient with pertinent past medical history noted for afib/flutter and CAD presented to the ED complaining of profound fatigue in the setting of bradycardia. Of note, patient was previously seen and evaluated for similar presentation and had his home dose of Propanolol halved. States his heart rate remained persistently low despite not taking propanolol for the past two days. Heart rate ranged from 34-42 on ambulatory monitoring. Patient without associated chest pain, palpitations, shortness of breath, dizziness, nausea or vomiting.  In ED EKG remarkable for Aflutter@42bpm.  EP asked to evaluate for symptomatic bradycardia, Aflutter with slow VR.          PAST MEDICAL & SURGICAL HISTORY:  Essential tremor  Benign prostatic hypertrophy  Hypertension  Hypercholesterolemia  Hypothyroid  Hemorrhoids  S/P tonsillectomy  History of coronary angiogram  1997  History of cataract surgery, right          MEDICATIONS  (STANDING):  allopurinol  Oral Tab/Cap - Peds 200 milliGRAM(s) Oral daily  amLODIPine   Tablet 5 milliGRAM(s) Oral daily  atorvastatin 20 milliGRAM(s) Oral at bedtime  cholecalciferol 1000 Unit(s) Oral daily  heparin  Infusion.  Unit(s)/Hr (17 mL/Hr) IV Continuous <Continuous>  levothyroxine 75 MICROGram(s) Oral daily  losartan 100 milliGRAM(s) Oral daily  multivitamin 1 Tablet(s) Oral daily  pantoprazole    Tablet 40 milliGRAM(s) Oral before breakfast    MEDICATIONS  (PRN):  acetaminophen     Tablet .. 650 milliGRAM(s) Oral every 6 hours PRN Temp greater or equal to 38C (100.4F), Mild Pain (1 - 3)  aluminum hydroxide/magnesium hydroxide/simethicone Suspension 30 milliLiter(s) Oral every 4 hours PRN Dyspepsia  atropine Injectable 0.5 milliGRAM(s) IntraMuscular once PRN bradycardia  heparin   Injectable 7500 Unit(s) IV Push every 6 hours PRN For aPTT less than 40  heparin   Injectable 3500 Unit(s) IV Push every 6 hours PRN For aPTT between 40 - 57  melatonin 3 milliGRAM(s) Oral at bedtime PRN Insomnia  ondansetron Injectable 4 milliGRAM(s) IV Push every 8 hours PRN Nausea and/or Vomiting  zolpidem 5 milliGRAM(s) Oral at bedtime PRN Insomnia  zolpidem 5 milliGRAM(s) Oral at bedtime PRN Insomnia      Allergies    No Known Allergies            SOCIAL HISTORY: Denies tobacco, etoh abuse or illicit drug use    FAMILY HISTORY:  No pertinent family history in first degree relatives        Vital Signs Last 24 Hrs  T(C): 35.8 (2022 05:00), Max: 37.1 (2022 19:22)  T(F): 96.4 (2022 05:00), Max: 98.7 (2022 19:22)  HR: 47 (2022 10:00) (38 - 64)  BP: 150/62 (2022 10:00) (121/59 - 158/80)  BP(mean): 84 (2022 10:00) (73 - 104)  RR: 13 (2022 10:00) (12 - 20)  SpO2: 96% (2022 10:00) (94% - 100%)    REVIEW OF SYSTEMS:    CONSTITUTIONAL:  As per HPI.  HEENT:  Eyes:  No diplopia or blurred vision. ENT:  No earache, sore throat or runny nose.  CARDIOVASCULAR:  No pressure, squeezing, strangling, tightness, heaviness or aching about the chest, neck, axilla or epigastrium.  RESPIRATORY:  No cough, shortness of breath, PND or orthopnea.  GASTROINTESTINAL:  No nausea, vomiting or diarrhea.  GENITOURINARY:  No dysuria, frequency or urgency.  MUSCULOSKELETAL:  As per HPI.  SKIN:  No change in skin, hair or nails.  NEUROLOGIC:  No paresthesias, fasciculations, seizures or weakness.  PSYCHIATRIC:  No disorder of thought or mood.  ENDOCRINE:  No heat or cold intolerance, polyuria or polydipsia.  HEMATOLOGICAL:  No easy bruising or bleedings:  .     PHYSICAL EXAMINATION:    GENERAL APPEARANCE:  Pt. is not currently dyspneic, in no distress. Pt. is alert, oriented, and pleasant.  HEENT:  Pupils are normal and react normally. No icterus. Mucous membranes well colored.  NECK:  Supple. No lymphadenopathy. Jugular venous pressure not elevated. Carotids equal.   HEART:   The cardiac impulse has a normal quality. There are no murmurs, rubs or gallops noted  CHEST:  Chest is clear to auscultation. Normal respiratory effort.  ABDOMEN:  Soft and nontender.   EXTREMITIES: B/L +1 edema   SKIN:  No rash or significant lesions are noted.    I&O's Summary    2022 07:01  -  2022 07:00  --------------------------------------------------------  IN: 118 mL / OUT: 450 mL / NET: -332 mL        LABS:                        13.9   7.10  )-----------( 170      ( 2022 05:43 )             41.8     06-06    140  |  108  |  25<H>  ----------------------------<  131<H>  3.9   |  26  |  1.11    Ca    9.0      2022 05:43  Phos  3.3     06-06  Mg     2.0     06-06    TPro  5.8<L>  /  Alb  3.1<L>  /  TBili  0.8  /  DBili  x   /  AST  16  /  ALT  22  /  AlkPhos  69  06-06    LIVER FUNCTIONS - ( 2022 05:43 )  Alb: 3.1 g/dL / Pro: 5.8 gm/dL / ALK PHOS: 69 U/L / ALT: 22 U/L / AST: 16 U/L / GGT: x           PT/INR - ( 2022 05:43 )   PT: 12.7 sec;   INR: 1.09 ratio         PTT - ( 2022 05:43 )  PTT:129.4 sec            EK22  Aflutter@42bpm  QRS: 84ms  QT/QTc: 444/370ms    TELEMETRY: Aflutter 40-70bpm    CARDIAC TESTS:  Echo: pending    RADIOLOGY & ADDITIONAL STUDIES:  < from: Xray Chest 1 View- PORTABLE-Urgent (Xray Chest 1 View- PORTABLE-Urgent .) (22 @ 15:37) >  INTERPRETATION:  AP erect chest on 2022 at 3:31 PM. Patient has   symptomatic bradycardia and is scheduled for admission.    Heart magnified by technique.    Lungs remain clear.    Chest is similar to May 18 of this year.    IMPRESSION: No acute finding or change.            
  CHIEF COMPLAINT:  Patient is a 86y old  Male who presents with a chief complaint of Bradycardia (2022 22:20)      HPI: 22:  86 year old male patient with pertinent past medical history noted for afib/flutter and CAD presented to the ED complaining of profound fatigue in the setting of bradycardia. Of note, patient was previously seen and evaluated for similar presentation and had his home dose of Propanolol halved. States his heart rate remained persistently low despite not take propanolol for the past two days. Heart rate ranged from 34-42 on ambulatory monitoring. Patient without associated chest pain, palpitations, shortness of breath, dizziness, nausea or vomiting.  On telemetry monitoring in the ED patient with heart range typically in the 40s but occasionally dipped to the 30s.   EKG monitor showing A. Flutter with slow VR with rates in the 30's off Propranolol.        PMHx:  PAST MEDICAL & SURGICAL HISTORY:  Essential tremor  Benign prostatic hypertrophy  Hypertension  Hypercholesterolemia  Hypothyroid  Hemorrhoids  S/P tonsillectomy  History of coronary angiogram-  History of cataract surgery, right      FAMILY HISTORY:   FAMILY HISTORY:  No pertinent family history in first degree relatives      ALLERGIES:  Allergies  No Known Allergies      REVIEW OF SYSTEMS:  10 point ROS was obtained  Pertinent positives and negatives are as above  All other review of systems is negative unless indicated above      Vital Signs Last 24 Hrs  T(C): 35.8 (2022 05:00), Max: 37.1 (2022 19:22)  T(F): 96.4 (2022 05:00), Max: 98.7 (2022 19:22)  HR: 53 (2022 05:00) (38 - 53)  BP: 138/76 (2022 05:00) (121/59 - 158/80)  BP(mean): 93 (2022 05:00) (73 - 104)  RR: 19 (2022 05:00) (12 - 20)  SpO2: 99% (2022 05:00) (94% - 100%)    I&O's Summary  2022 07:01  -  2022 07:00  --------------------------------------------------------  IN: 118 mL / OUT: 450 mL / NET: -332 mL        PHYSICAL EXAM:   Constitutional: NAD, awake and alert, well-developed  HEENT: PERR, EOMI, Normal Hearing, MMM  Neck: Soft and supple, No LAD, No JVD  Respiratory: Breath sounds are clear bilaterally, No wheezing, rales or rhonchi  Cardiovascular: S1 and S2, regular rate and rhythm, soft POONAM at LLSB and base as before, no gallops or rubs  Gastrointestinal: Bowel Sounds present, soft, nontender, nondistended, no guarding, no rebound  Extremities: No peripheral edema  Vascular: 2+ peripheral pulses  Neurological: A/O x 3, no focal deficits  Musculoskeletal: 5/5 strength b/l upper and lower extremities  Skin: No rashes      MEDICATIONS  (STANDING):  allopurinol  Oral Tab/Cap - Peds 200 milliGRAM(s) Oral daily  amLODIPine   Tablet 5 milliGRAM(s) Oral daily  atorvastatin 20 milliGRAM(s) Oral at bedtime  cholecalciferol 1000 Unit(s) Oral daily  heparin  Infusion.  Unit(s)/Hr (17 mL/Hr) IV Continuous <Continuous>  levothyroxine 75 MICROGram(s) Oral daily  losartan 100 milliGRAM(s) Oral daily  multivitamin 1 Tablet(s) Oral daily  pantoprazole    Tablet 40 milliGRAM(s) Oral before breakfast    MEDICATIONS  (PRN):  acetaminophen     Tablet .. 650 milliGRAM(s) Oral every 6 hours PRN Temp greater or equal to 38C (100.4F), Mild Pain (1 - 3)  aluminum hydroxide/magnesium hydroxide/simethicone Suspension 30 milliLiter(s) Oral every 4 hours PRN Dyspepsia  atropine Injectable 0.5 milliGRAM(s) IntraMuscular once PRN bradycardia  heparin   Injectable 7500 Unit(s) IV Push every 6 hours PRN For aPTT less than 40  heparin   Injectable 3500 Unit(s) IV Push every 6 hours PRN For aPTT between 40 - 57  melatonin 3 milliGRAM(s) Oral at bedtime PRN Insomnia  ondansetron Injectable 4 milliGRAM(s) IV Push every 8 hours PRN Nausea and/or Vomiting  zolpidem 5 milliGRAM(s) Oral at bedtime PRN Insomnia  zolpidem 5 milliGRAM(s) Oral at bedtime PRN Insomnia      LABS: All Labs Reviewed:                        13.9   7.10  )-----------( 170      ( 2022 05:43 )             41.8     06-06    140  |  108  |  25<H>  ----------------------------<  131<H>  3.9   |  26  |  1.11    Ca    9.0      2022 05:43  Phos  3.3     06-06  Mg     2.0     06-06    TPro  5.8<L>  /  Alb  3.1<L>  /  TBili  0.8  /  DBili  x   /  AST  16  /  ALT  22  /  AlkPhos  69  06-06    PT/INR - ( 2022 05:43 )   PT: 12.7 sec;   INR: 1.09 ratio       PTT - ( 2022 05:43 )  PTT:129.4 sec    Serum Pro-Brain Natriuretic Peptide: 2052 pg/mL ( @ 14:59)    Troponin I, High Sensitivity (22 @ 21:15): 21.33  Troponin I, High Sensitivity (22 @ 18:01): 28.49  Troponin I, High Sensitivity (22 @ 14:59): 23.18    BLOOD CULTURES:   LIPID PROFILE     RADIOLOGY:  CXR: 22:  INTERPRETATION:  AP erect chest on 2022 at 3:31 PM. Patient has symptomatic bradycardia and is scheduled for admission.  Heart magnified by technique.  Lungs remain clear.  Chest is similar to May 18 of this year.  IMPRESSION: No acute finding or change.      EK22:  A. Flutter with slow VR and bradycardia    TELEMETRY:  A. Flutter with slow VR    ECHO:  22:  M-Mode Measurements (cm)   LVEDd: 4.68 cm            LVESd: 3.18 cm   IVSEd: 1.1 cm   LVPWd: 1.09 cm            AO Root Dimension: 3.3 cm                             ACS: 1.8 cm                             LA: 4.6 cm  Doppler Measurements:   AV Velocity:146 cm/s               MV Peak E-Wave: 104 cm/s   AV Peak Gradient: 8.53 mmHg        MV Peak A-Wave: 51.8 cm/s                                      MV E/A Ratio: 2.01 %   TR Velocity:290 cm/s               MV Peak Gradient: 4.33 mmHg   TR Gradient:33.64 mmHg   Estimated RAP:5 mmHg   RVSP:39 mmHg    Findings  Mitral Valve   The mitral valve leaflets appear thin and normal.   Mild (1+) mitral regurgitation is present.    Aortic Valve   The aortic valve is well visualized, appears mildly calcified. Valve opening seems to be normal.    Tricuspid Valve   Normal appearing tricuspid valve structure.   Mild to moderate Tricuspid regurgitation is present.   Mild pulmonary hypertension.    Pulmonic Valve   Pulmonic valve not well seen.   Mild pulmonic valvular regurgitation (1+) is present.    Left Atrium   The left atrium is moderately dilated.    Left Ventricle   Estimated left ventricular ejection fraction is 60-65 %.   The left ventricle is normal in size, wall thickness, wall motion and contractility as seen in limited views.    Right Atrium   Normal appearing right atrium.    Right Ventricle   Normal appearing right ventricle structure and function.    Pericardial Effusion   No evidence of pericardial effusion.    Pleural Effusion   No evidence of pleural effusion.    Miscellaneous   All visualized extra cardiac structures appears to be normal.    Summary   Estimated left ventricular ejection fraction is 60-65 %.   The left ventricle is normal in size, wall thickness, wall motion and contractility as seen in limited views.   The left atrium is moderately dilated.   Normal appearing right ventricle structure and function.   The aortic valve is well visualized, appears mildly calcified. Valve opening seems to be normal.   The mitral valve leaflets appear thin and normal.   Mild (1+) mitral regurgitation is present.   Normal appearing tricuspid valve structure.   Mild to moderate Tricuspid regurgitation is present.   Mild pulmonary hypertension.   Pulmonic valve not well seen.   Mild pulmonic valvular regurgitation (1+) is present.   No evidence of pericardial effusion.    Signature   ----------------------------------------------------------------   Electronically signed by Darrion Sierra DO(Interpreting   physician) on 2022 11:12 AM   ----------------------------------------------------------------

## 2022-06-06 NOTE — PROVIDER CONTACT NOTE (OTHER) - RECOMMENDATIONS
Per description of stoma MD who was present during case, states swelling and some bleeding should be expected.

## 2022-06-06 NOTE — PROGRESS NOTE ADULT - ASSESSMENT
ASSESSMENT:  87yo male with PMHx HTN, HLD, CAD, Hypothyroid presents with lightheadedness, fatigue and low HR (40s) x 2 weeks.    Found to have sick sinus syndrome vs Aflutter with slow VR  EKG showing aflutter with slow ventricular rate (40s), was taking propanolol for essential tremors and HTN. Outpt cardiologist decreased dose by 1/2, pt was compliant.   Troponins neg x 3, TSH wnl      PLAN:  EP and Cardio consulted  NPO after midnight for possible PPM tomorrow  cont to hold propanolol  on heparin infusion for Aflutter  Cont home anti-HTN meds, amlodipine and olmesartan  Monitor electrolytes (K, Mg)  TTE pending  Cont levothyroxine    Discussed with Dr. Phoenix     ASSESSMENT:  85yo male with PMHx HTN, HLD, CAD, Hypothyroid presents with lightheadedness, fatigue and low HR (40s) x 2 weeks.    Found to have sick sinus syndrome vs Aflutter with slow VR  EKG showing aflutter with slow ventricular rate (40s), was taking propanolol for essential tremors and HTN. Outpt cardiologist decreased dose by 1/2, pt was compliant.   Troponins neg x 3, TSH wnl      PLAN:  EP and Cardio consulted  NPO after midnight for possible PPM tomorrow  cont to hold propanolol  on heparin infusion for Aflutter  Cont home anti-HTN meds, amlodipine and olmesartan  Monitor electrolytes (K, Mg)  TTE pending  Cont levothyroxine    Discussed with Dr. Phoenix        Attending attestation:     Case discussed, agree with above A&P    **THIS NOTE IS SOLELY FOR ACADEMIC PURPOSES**

## 2022-06-06 NOTE — PROVIDER CONTACT NOTE (OTHER) - ASSESSMENT
Pt stoma beefy red but swollen and bleeding
Stoma beefy red but edematous and bleeding, 20ml emptied out of ostomy pouch.  Dressing is not constricting stoma at this time.

## 2022-06-07 LAB
APTT BLD: 103.2 SEC — HIGH (ref 27.5–35.5)
HCT VFR BLD CALC: 42.2 % — SIGNIFICANT CHANGE UP (ref 39–50)
HGB BLD-MCNC: 14.3 G/DL — SIGNIFICANT CHANGE UP (ref 13–17)
MCHC RBC-ENTMCNC: 29.2 PG — SIGNIFICANT CHANGE UP (ref 27–34)
MCHC RBC-ENTMCNC: 33.9 GM/DL — SIGNIFICANT CHANGE UP (ref 32–36)
MCV RBC AUTO: 86.3 FL — SIGNIFICANT CHANGE UP (ref 80–100)
PLATELET # BLD AUTO: 165 K/UL — SIGNIFICANT CHANGE UP (ref 150–400)
RBC # BLD: 4.89 M/UL — SIGNIFICANT CHANGE UP (ref 4.2–5.8)
RBC # FLD: 14 % — SIGNIFICANT CHANGE UP (ref 10.3–14.5)
WBC # BLD: 6.52 K/UL — SIGNIFICANT CHANGE UP (ref 3.8–10.5)
WBC # FLD AUTO: 6.52 K/UL — SIGNIFICANT CHANGE UP (ref 3.8–10.5)

## 2022-06-07 PROCEDURE — 93010 ELECTROCARDIOGRAM REPORT: CPT

## 2022-06-07 PROCEDURE — 71045 X-RAY EXAM CHEST 1 VIEW: CPT | Mod: 26

## 2022-06-07 PROCEDURE — 33208 INSRT HEART PM ATRIAL & VENT: CPT | Mod: KX

## 2022-06-07 PROCEDURE — 99232 SBSQ HOSP IP/OBS MODERATE 35: CPT

## 2022-06-07 RX ORDER — CEFAZOLIN SODIUM 1 G
2000 VIAL (EA) INJECTION ONCE
Refills: 0 | Status: COMPLETED | OUTPATIENT
Start: 2022-06-07 | End: 2022-06-07

## 2022-06-07 RX ORDER — CEFAZOLIN SODIUM 1 G
1000 VIAL (EA) INJECTION EVERY 8 HOURS
Refills: 0 | Status: DISCONTINUED | OUTPATIENT
Start: 2022-06-07 | End: 2022-06-07

## 2022-06-07 RX ORDER — CLOPIDOGREL BISULFATE 75 MG/1
75 TABLET, FILM COATED ORAL DAILY
Refills: 0 | Status: COMPLETED | OUTPATIENT
Start: 2022-06-07 | End: 2022-06-07

## 2022-06-07 RX ORDER — CEFAZOLIN SODIUM 1 G
1000 VIAL (EA) INJECTION EVERY 8 HOURS
Refills: 0 | Status: COMPLETED | OUTPATIENT
Start: 2022-06-07 | End: 2022-06-07

## 2022-06-07 RX ORDER — CLOPIDOGREL BISULFATE 75 MG/1
75 TABLET, FILM COATED ORAL DAILY
Refills: 0 | Status: DISCONTINUED | OUTPATIENT
Start: 2022-06-08 | End: 2022-06-08

## 2022-06-07 RX ADMIN — PANTOPRAZOLE SODIUM 40 MILLIGRAM(S): 20 TABLET, DELAYED RELEASE ORAL at 06:01

## 2022-06-07 RX ADMIN — Medication 1000 UNIT(S): at 13:02

## 2022-06-07 RX ADMIN — ATORVASTATIN CALCIUM 20 MILLIGRAM(S): 80 TABLET, FILM COATED ORAL at 21:23

## 2022-06-07 RX ADMIN — Medication 1000 MILLIGRAM(S): at 13:03

## 2022-06-07 RX ADMIN — Medication 100 MILLIGRAM(S): at 09:24

## 2022-06-07 RX ADMIN — Medication 200 MILLIGRAM(S): at 13:01

## 2022-06-07 RX ADMIN — ZOLPIDEM TARTRATE 5 MILLIGRAM(S): 10 TABLET ORAL at 21:22

## 2022-06-07 RX ADMIN — Medication 1000 MILLIGRAM(S): at 21:23

## 2022-06-07 RX ADMIN — HEPARIN SODIUM 1100 UNIT(S)/HR: 5000 INJECTION INTRAVENOUS; SUBCUTANEOUS at 04:40

## 2022-06-07 RX ADMIN — LOSARTAN POTASSIUM 100 MILLIGRAM(S): 100 TABLET, FILM COATED ORAL at 13:02

## 2022-06-07 RX ADMIN — Medication 75 MICROGRAM(S): at 06:02

## 2022-06-07 RX ADMIN — AMLODIPINE BESYLATE 5 MILLIGRAM(S): 2.5 TABLET ORAL at 13:01

## 2022-06-07 RX ADMIN — Medication 1 TABLET(S): at 13:01

## 2022-06-07 NOTE — PROGRESS NOTE ADULT - SUBJECTIVE AND OBJECTIVE BOX
CC: Bradycardia (07 Jun 2022 09:35)    HPI: 86 year old male patient with pertinent past medical history noted for afib/flutter and CAD presented to the ED complaining of profound fatigue in the setting of bradycardia. Of note, patient was previously seen and evaluated for similar presentation and had his home dose of Propanolol halved. States his heart rate remained persistently low despite not take propanolol for the past two days. Heart rate ranged from 34-42 on ambulatory monitoring. Patient without associated chest pain, palpitations, shortness of breath, dizziness, nausea or vomiting.    On telemetry monitoring in the ED patient with heart range typically in the 40s but occasionally dipped to the 30s. (05 Jun 2022 22:20)    INTERVAL HPI/OVERNIGHT EVENTS: s/p PPM - mild soreness left upper chest, rhythm paced, pt is concerned about DC instructions  Other ROS reviewed and neg     Vital Signs Last 24 Hrs  T(C): 36.6 (07 Jun 2022 11:57), Max: 37.2 (07 Jun 2022 08:44)  T(F): 97.8 (07 Jun 2022 11:57), Max: 98.9 (07 Jun 2022 08:44)  HR: 70 (07 Jun 2022 13:00) (42 - 72)  BP: 151/68 (07 Jun 2022 13:00) (128/57 - 171/77)  BP(mean): 89 (07 Jun 2022 13:00) (74 - 105)  RR: 12 (07 Jun 2022 13:00) (6 - 21)  SpO2: 99% (07 Jun 2022 13:00) (86% - 100%)  I&O's Detail    06 Jun 2022 07:01  -  07 Jun 2022 07:00  --------------------------------------------------------  IN:  Total IN: 0 mL    OUT:    Voided (mL): 700 mL  Total OUT: 700 mL    Total NET: -700 mL                        14.3   6.52  )-----------( 165      ( 07 Jun 2022 03:46 )             42.2     06 Jun 2022 05:43    140    |  108    |  25     ----------------------------<  131    3.9     |  26     |  1.11     Ca    9.0        06 Jun 2022 05:43  Phos  3.3       06 Jun 2022 05:43  Mg     2.0       06 Jun 2022 05:43    TPro  5.8    /  Alb  3.1    /  TBili  0.8    /  DBili  x      /  AST  16     /  ALT  22     /  AlkPhos  69     06 Jun 2022 05:43    PT/INR - ( 06 Jun 2022 05:43 )   PT: 12.7 sec;   INR: 1.09 ratio       PTT - ( 07 Jun 2022 03:46 )  PTT:103.2 sec    LIVER FUNCTIONS - ( 06 Jun 2022 05:43 )  Alb: 3.1 g/dL / Pro: 5.8 gm/dL / ALK PHOS: 69 U/L / ALT: 22 U/L / AST: 16 U/L / GGT: x           MEDICATIONS  (STANDING):  allopurinol  Oral Tab/Cap - Peds 200 milliGRAM(s) Oral daily  amLODIPine   Tablet 5 milliGRAM(s) Oral daily  atorvastatin 20 milliGRAM(s) Oral at bedtime  ceFAZolin  Injectable. 1000 milliGRAM(s) IV Push every 8 hours  cholecalciferol 1000 Unit(s) Oral daily  heparin  Infusion.  Unit(s)/Hr (17 mL/Hr) IV Continuous <Continuous>  levothyroxine 75 MICROGram(s) Oral daily  losartan 100 milliGRAM(s) Oral daily  multivitamin 1 Tablet(s) Oral daily  pantoprazole    Tablet 40 milliGRAM(s) Oral before breakfast    MEDICATIONS  (PRN):  acetaminophen     Tablet .. 650 milliGRAM(s) Oral every 6 hours PRN Temp greater or equal to 38C (100.4F), Mild Pain (1 - 3)  aluminum hydroxide/magnesium hydroxide/simethicone Suspension 30 milliLiter(s) Oral every 4 hours PRN Dyspepsia  atropine Injectable 0.5 milliGRAM(s) IntraMuscular once PRN bradycardia  heparin   Injectable 7500 Unit(s) IV Push every 6 hours PRN For aPTT less than 40  heparin   Injectable 3500 Unit(s) IV Push every 6 hours PRN For aPTT between 40 - 57  melatonin 3 milliGRAM(s) Oral at bedtime PRN Insomnia  ondansetron Injectable 4 milliGRAM(s) IV Push every 8 hours PRN Nausea and/or Vomiting  zolpidem 5 milliGRAM(s) Oral at bedtime PRN Insomnia  zolpidem 5 milliGRAM(s) Oral at bedtime PRN Insomnia    RADIOLOGY & ADDITIONAL TESTS: personally visualized    PHYSICAL EXAM:    General: male in no acute distress  Eyes: PERRLA, EOMI; conjunctiva and sclera clear  Head: Normocephalic; atraumatic  ENMT: No nasal discharge; airway clear  Neck: Supple; non tender; no masses  Respiratory: No wheezes, rales or rhonchi  Cardiovascular: S1, S2 paced, Left upper chest PPM in compression dressing  Gastrointestinal: Soft non-tender non-distended; Normal bowel sounds  Genitourinary: No costovertebral angle tenderness  Extremities: No clubbing, cyanosis or edema  Vascular: Peripheral pulses palpable 2+ bilaterally  Neurological: Alert and oriented x4  Skin: Warm and dry  Musculoskeletal: Normal tone  Psychiatric: Cooperative and appropriate

## 2022-06-07 NOTE — PROGRESS NOTE ADULT - ASSESSMENT
* Symptomatic Bradycardia in pt with Aflutter - SSS s/p PPM placement today - paced now, start AC tomorrow    * Aflutter/fib - start AC tomorrow    * HTN - cont losartan    * Hypothyroidism - levothyroxine    * GERD - PPI

## 2022-06-07 NOTE — PROGRESS NOTE ADULT - SUBJECTIVE AND OBJECTIVE BOX
Patient is a 86y old  Male who presents with a chief complaint of Bradycardia     HPI: 85yo male with PMHx HTN, HLD, CAD, Hypothyroid presents with lightheadedness, fatigue and low HR (40s) taken at home. Pt states that he has been experiencing lightheadedness and fatigue for weeks with improvement or worsening of sxs. Reports that he checked his BP yesterday and came to the ED because it was in the 40s. He recently was seen by cardiologist Dr. Haines 2 weeks ago for bradycardia and propanolol dose was decreased by 1/2. Pt states he last took his propanolol on 6/4. Denies HA, syncope, falls, chest pain, SOB, recent travel, known exposure to ticks.    Events last 24 hours: Pt assessed at bedside. c/o feeling weak, fatigued and mild nausea. Denies chest pain, SOB, vomiting. Heparin dcd this morning for PPM.    TELEMETRY: Aflutter with slow ventricular rate (40- 50s)    PAST MEDICAL & SURGICAL HISTORY:  Essential tremor  Benign prostatic hypertrophy  Hypertension  Hypercholesterolemia  Hypothyroid  Hemorrhoids  S/P tonsillectomy  History of coronary angiogram 1997  History of cataract surgery, right    MEDICATIONS  (STANDING):  allopurinol  Oral Tab/Cap - Peds 200 milliGRAM(s) Oral daily  amLODIPine   Tablet 5 milliGRAM(s) Oral daily  atorvastatin 20 milliGRAM(s) Oral at bedtime  ceFAZolin   IVPB 2000 milliGRAM(s) IV Intermittent once  cholecalciferol 1000 Unit(s) Oral daily  heparin  Infusion.  Unit(s)/Hr (17 mL/Hr) IV Continuous <Continuous>  levothyroxine 75 MICROGram(s) Oral daily  losartan 100 milliGRAM(s) Oral daily  multivitamin 1 Tablet(s) Oral daily  pantoprazole    Tablet 40 milliGRAM(s) Oral before breakfast    ICU Vital Signs Last 24 Hrs  T(C): 37.2 (07 Jun 2022 08:44), Max: 37.2 (07 Jun 2022 08:44)  T(F): 98.9 (07 Jun 2022 08:44), Max: 98.9 (07 Jun 2022 08:44)  HR: 58 (07 Jun 2022 08:44) (42 - 64)  BP: 171/77 (07 Jun 2022 08:44) (128/57 - 171/77)  BP(mean): 88 (07 Jun 2022 07:00) (74 - 94)  ABP: --  ABP(mean): --  RR: 16 (07 Jun 2022 08:44) (6 - 26)  SpO2: 97% (07 Jun 2022 08:44) (86% - 100%)    LABS:                        14.3   6.52  )-----------( 165      ( 07 Jun 2022 03:46 )             42.2     06-06    140  |  108  |  25<H>  ----------------------------<  131<H>  3.9   |  26  |  1.11    Ca    9.0      06 Jun 2022 05:43  Phos  3.3     06-06  Mg     2.0     06-06    TPro  5.8<L>  /  Alb  3.1<L>  /  TBili  0.8  /  DBili  x   /  AST  16  /  ALT  22  /  AlkPhos  69  06-06    LIVER FUNCTIONS - ( 06 Jun 2022 05:43 )  Alb: 3.1 g/dL / Pro: 5.8 gm/dL / ALK PHOS: 69 U/L / ALT: 22 U/L / AST: 16 U/L / GGT: x           PT/INR - ( 06 Jun 2022 05:43 )   PT: 12.7 sec;   INR: 1.09 ratio    PTT - ( 07 Jun 2022 03:46 )  PTT:103.2 sec    Physical Examination:    General: No acute distress.  awake and alert. pleasant  HEENT: Pupils equal, reactive to light.  EOMIs b/l  PULM: Clear to auscultation bilaterally, no wheezing  NECK: Supple, trachea midline  CVS: Bradycardic, irregular rhythm, no murmurs  ABD: Soft, nondistended, nontender, normoactive bowel sounds,  EXT: No peripheral edema, no calf tenderness b/l  SKIN: Warm and well perfused, no rashes noted.  NEURO: Alert, oriented, interactive, no focal deficits

## 2022-06-07 NOTE — PROGRESS NOTE ADULT - ASSESSMENT
ASSESSMENT:  85yo male with PMHx HTN, HLD, CAD, Hypothyroid presents with lightheadedness, fatigue and low HR (40s) x 2 weeks.    Found to have sick sinus syndrome vs Aflutter with slow VR  EKG showing aflutter with slow ventricular rate (40s), was taking propanolol for essential tremors and HTN. Outpt cardiologist decreased dose by 1/2, pt was compliant.   Troponins neg x 3, TSH wnl    PLAN:  EP and Cardio consulted  Plan for PPM today  Will need AC after PPM, recommendations as per cardio  cont to hold propanolol  Cont home anti-HTN meds, amlodipine and olmesartan  Monitor electrolytes (K, Mg)  Cont levothyroxine    Will discuss with Dr. Phoenix   ASSESSMENT:  87yo male with PMHx HTN, HLD, CAD, Hypothyroid presents with lightheadedness, fatigue and low HR (40s) x 2 weeks.    Found to have sick sinus syndrome vs Aflutter with slow VR  EKG showing aflutter with slow ventricular rate (40s), was taking propanolol for essential tremors and HTN. Outpt cardiologist decreased dose by 1/2, pt was compliant.   Troponins neg x 3, TSH wnl    PLAN:  EP and Cardio consulted  Plan for PPM today  Will need AC after PPM, recommendations as per cardio  cont to hold propanolol  Cont home anti-HTN meds, amlodipine and olmesartan  Monitor electrolytes (K, Mg)  Cont levothyroxine    Will discuss with Dr. Phoenix      **THIS NOTE IS FOR EDUCATIONAL PURPOSES ONLY**

## 2022-06-07 NOTE — PROCEDURE NOTE - NSICDXPROCEDURE_GEN_ALL_CORE_FT
PROCEDURES:  Implantation of intravenous dual chamber permanent cardiac pacemaker 07-Jun-2022 10:12:24  Edith Rainey

## 2022-06-07 NOTE — PROGRESS NOTE ADULT - ASSESSMENT
6/6/22:  Pt with above history and symptomatic bradycardia now with A. Flutter with slow VR (Ventricular Response) off low dose Propranolol.  Will need a PPM and may need to consider RENE cardioversion after.  Will have EP seen the pt.  He did well on Propranolol both for hs BP and essential tremors but will need to hold that for now.  Agree with Heparin and will ultimately need Eliquis and consider stopping the Plavix.  Continue as outlined by medicine and EP etal.  Will follow.    6/7/22:  Feeling ok.  For PPM today.  Still in a. flutter on the monitor although the vent. rate is 40's-50's.  May need RENE cardioversion in the future but previously he converted spontaneously on meds.  Will need long term oral AC (Eliquis) after the PPM (tomorrow) and will stay off ASA and Plavix for now.  No other new cardiac changes.  Continue as outlined by medicine and EP etal.  Will follow.

## 2022-06-07 NOTE — PROGRESS NOTE ADULT - SUBJECTIVE AND OBJECTIVE BOX
CHIEF COMPLAINT:  Patient is a 86y old  Male who presents with a chief complaint of Bradycardia (2022 22:20)      HPI: 22:  86 year old male patient with pertinent past medical history noted for afib/flutter and CAD presented to the ED complaining of profound fatigue in the setting of bradycardia. Of note, patient was previously seen and evaluated for similar presentation and had his home dose of Propanolol halved. States his heart rate remained persistently low despite not take propanolol for the past two days. Heart rate ranged from 34-42 on ambulatory monitoring. Patient without associated chest pain, palpitations, shortness of breath, dizziness, nausea or vomiting.  On telemetry monitoring in the ED patient with heart range typically in the 40s but occasionally dipped to the 30s.   EKG monitor showing A. Flutter with slow VR with rates in the 30's off Propranolol.    22:  Feeling ok.  For PPM today.  Still in a. flutter on the monitor although the vent. rate is 40's-50's.  May need RENE cardioversion in the future but previously he converted spontaneously on meds.  Will need long term oral AC (Eliquis) after the PPM (tomorrow) and will stay off ASA and Plavix for now.  No other new cardiac changes.        PMHx:  PAST MEDICAL & SURGICAL HISTORY:  Essential tremor  Benign prostatic hypertrophy  Hypertension  Hypercholesterolemia  Hypothyroid  Hemorrhoids  S/P tonsillectomy  History of coronary angiogram-  History of cataract surgery, right      FAMILY HISTORY:   FAMILY HISTORY:  No pertinent family history in first degree relatives      ALLERGIES:  Allergies  No Known Allergies      REVIEW OF SYSTEMS:  10 point ROS was obtained  Pertinent positives and negatives are as above  All other review of systems is negative unless indicated above      ICU Vital Signs Last 24 Hrs  T(C): 36.2 (2022 06:06), Max: 36.9 (2022 10:00)  T(F): 97.2 (2022 06:06), Max: 98.4 (2022 10:00)  HR: 45 (2022 06:00) (42 - 64)  BP: 147/66 (2022 06:00) (128/57 - 159/69)  BP(mean): 88 (2022 06:00) (74 - 94)  RR: 15 (2022 06:00) (6 - 26)  SpO2: 96% (2022 06:00) (86% - 100%)        I&O's Summary  2022 07:01  -  2022 07:00  --------------------------------------------------------  IN: 118 mL / OUT: 450 mL / NET: -332 mL        PHYSICAL EXAM:   Constitutional: NAD, awake and alert, well-developed  HEENT: PERR, EOMI, Normal Hearing, MMM  Neck: Soft and supple, No LAD, No JVD  Respiratory: Breath sounds are clear bilaterally, No wheezing, rales or rhonchi  Cardiovascular: S1 and S2, regular rate and rhythm, soft POONAM at LLSB and base as before, no gallops or rubs  Gastrointestinal: Bowel Sounds present, soft, nontender, nondistended, no guarding, no rebound  Extremities: No peripheral edema  Vascular: 2+ peripheral pulses  Neurological: A/O x 3, no focal deficits  Musculoskeletal: 5/5 strength b/l upper and lower extremities  Skin: No rashes      MEDICATIONS  (STANDING):  allopurinol  Oral Tab/Cap - Peds 200 milliGRAM(s) Oral daily  amLODIPine   Tablet 5 milliGRAM(s) Oral daily  atorvastatin 20 milliGRAM(s) Oral at bedtime  cholecalciferol 1000 Unit(s) Oral daily  heparin  Infusion.  Unit(s)/Hr (17 mL/Hr) IV Continuous <Continuous>  levothyroxine 75 MICROGram(s) Oral daily  losartan 100 milliGRAM(s) Oral daily  multivitamin 1 Tablet(s) Oral daily  pantoprazole    Tablet 40 milliGRAM(s) Oral before breakfast    MEDICATIONS  (PRN):  acetaminophen     Tablet .. 650 milliGRAM(s) Oral every 6 hours PRN Temp greater or equal to 38C (100.4F), Mild Pain (1 - 3)  aluminum hydroxide/magnesium hydroxide/simethicone Suspension 30 milliLiter(s) Oral every 4 hours PRN Dyspepsia  atropine Injectable 0.5 milliGRAM(s) IntraMuscular once PRN bradycardia  heparin   Injectable 7500 Unit(s) IV Push every 6 hours PRN For aPTT less than 40  heparin   Injectable 3500 Unit(s) IV Push every 6 hours PRN For aPTT between 40 - 57  melatonin 3 milliGRAM(s) Oral at bedtime PRN Insomnia  ondansetron Injectable 4 milliGRAM(s) IV Push every 8 hours PRN Nausea and/or Vomiting  zolpidem 5 milliGRAM(s) Oral at bedtime PRN Insomnia  zolpidem 5 milliGRAM(s) Oral at bedtime PRN Insomnia      LABS: All Labs Reviewed:                        14.3   6.52  )-----------( 165      ( 2022 03:46 )             42.2                           13.9   7.10  )-----------( 170      ( 2022 05:43 )             41.8       06-06    140  |  108  |  25<H>  ----------------------------<  131<H>  3.9   |  26  |  1.11    Ca    9.0      2022 05:43  Phos  3.3     06-06  Mg     2.0     06-06    TPro  5.8<L>  /  Alb  3.1<L>  /  TBili  0.8  /  DBili  x   /  AST  16  /  ALT  22  /  AlkPhos  69  06-06    PT/INR - ( 2022 05:43 )   PT: 12.7 sec;   INR: 1.09 ratio       PTT - ( 2022 05:43 )  PTT:129.4 sec    Serum Pro-Brain Natriuretic Peptide: 2052 pg/mL ( @ 14:59)    Troponin I, High Sensitivity (22 @ 21:15): 21.33  Troponin I, High Sensitivity (22 @ 18:01): 28.49  Troponin I, High Sensitivity (22 @ 14:59): 23.18    BLOOD CULTURES:   LIPID PROFILE     RADIOLOGY:  CXR: 22:  INTERPRETATION:  AP erect chest on 2022 at 3:31 PM. Patient has symptomatic bradycardia and is scheduled for admission.  Heart magnified by technique.  Lungs remain clear.  Chest is similar to May 18 of this year.  IMPRESSION: No acute finding or change.      EK22:  A. Flutter with slow VR and bradycardia    TELEMETRY:  A. Flutter with slow VR    ECHO:  22:  M-Mode Measurements (cm)   LVEDd: 4.68 cm            LVESd: 3.18 cm   IVSEd: 1.1 cm   LVPWd: 1.09 cm            AO Root Dimension: 3.3 cm                             ACS: 1.8 cm                             LA: 4.6 cm  Doppler Measurements:   AV Velocity:146 cm/s               MV Peak E-Wave: 104 cm/s   AV Peak Gradient: 8.53 mmHg        MV Peak A-Wave: 51.8 cm/s                                      MV E/A Ratio: 2.01 %   TR Velocity:290 cm/s               MV Peak Gradient: 4.33 mmHg   TR Gradient:33.64 mmHg   Estimated RAP:5 mmHg   RVSP:39 mmHg    Findings  Mitral Valve   The mitral valve leaflets appear thin and normal.   Mild (1+) mitral regurgitation is present.    Aortic Valve   The aortic valve is well visualized, appears mildly calcified. Valve opening seems to be normal.    Tricuspid Valve   Normal appearing tricuspid valve structure.   Mild to moderate Tricuspid regurgitation is present.   Mild pulmonary hypertension.    Pulmonic Valve   Pulmonic valve not well seen.   Mild pulmonic valvular regurgitation (1+) is present.    Left Atrium   The left atrium is moderately dilated.    Left Ventricle   Estimated left ventricular ejection fraction is 60-65 %.   The left ventricle is normal in size, wall thickness, wall motion and contractility as seen in limited views.    Right Atrium   Normal appearing right atrium.    Right Ventricle   Normal appearing right ventricle structure and function.    Pericardial Effusion   No evidence of pericardial effusion.    Pleural Effusion   No evidence of pleural effusion.    Miscellaneous   All visualized extra cardiac structures appears to be normal.    Summary   Estimated left ventricular ejection fraction is 60-65 %.   The left ventricle is normal in size, wall thickness, wall motion and contractility as seen in limited views.   The left atrium is moderately dilated.   Normal appearing right ventricle structure and function.   The aortic valve is well visualized, appears mildly calcified. Valve opening seems to be normal.   The mitral valve leaflets appear thin and normal.   Mild (1+) mitral regurgitation is present.   Normal appearing tricuspid valve structure.   Mild to moderate Tricuspid regurgitation is present.   Mild pulmonary hypertension.   Pulmonic valve not well seen.   Mild pulmonic valvular regurgitation (1+) is present.   No evidence of pericardial effusion.    Signature   ----------------------------------------------------------------   Electronically signed by Darrion Sierra DO([facility]   physician) on 2022 11:12 AM   ----------------------------------------------------------------

## 2022-06-08 ENCOUNTER — TRANSCRIPTION ENCOUNTER (OUTPATIENT)
Age: 87
End: 2022-06-08

## 2022-06-08 VITALS — HEART RATE: 70 BPM | DIASTOLIC BLOOD PRESSURE: 75 MMHG | SYSTOLIC BLOOD PRESSURE: 154 MMHG

## 2022-06-08 LAB
ANION GAP SERPL CALC-SCNC: 8 MMOL/L — SIGNIFICANT CHANGE UP (ref 5–17)
BASOPHILS # BLD AUTO: 0.03 K/UL — SIGNIFICANT CHANGE UP (ref 0–0.2)
BASOPHILS NFR BLD AUTO: 0.4 % — SIGNIFICANT CHANGE UP (ref 0–2)
BUN SERPL-MCNC: 20 MG/DL — SIGNIFICANT CHANGE UP (ref 7–23)
CALCIUM SERPL-MCNC: 8.9 MG/DL — SIGNIFICANT CHANGE UP (ref 8.5–10.1)
CHLORIDE SERPL-SCNC: 110 MMOL/L — HIGH (ref 96–108)
CO2 SERPL-SCNC: 24 MMOL/L — SIGNIFICANT CHANGE UP (ref 22–31)
CREAT SERPL-MCNC: 1.06 MG/DL — SIGNIFICANT CHANGE UP (ref 0.5–1.3)
EGFR: 68 ML/MIN/1.73M2 — SIGNIFICANT CHANGE UP
EOSINOPHIL # BLD AUTO: 0.11 K/UL — SIGNIFICANT CHANGE UP (ref 0–0.5)
EOSINOPHIL NFR BLD AUTO: 1.4 % — SIGNIFICANT CHANGE UP (ref 0–6)
GLUCOSE SERPL-MCNC: 123 MG/DL — HIGH (ref 70–99)
HCT VFR BLD CALC: 45 % — SIGNIFICANT CHANGE UP (ref 39–50)
HGB BLD-MCNC: 14.7 G/DL — SIGNIFICANT CHANGE UP (ref 13–17)
IMM GRANULOCYTES NFR BLD AUTO: 0.3 % — SIGNIFICANT CHANGE UP (ref 0–1.5)
LYMPHOCYTES # BLD AUTO: 1.53 K/UL — SIGNIFICANT CHANGE UP (ref 1–3.3)
LYMPHOCYTES # BLD AUTO: 19.8 % — SIGNIFICANT CHANGE UP (ref 13–44)
MCHC RBC-ENTMCNC: 29 PG — SIGNIFICANT CHANGE UP (ref 27–34)
MCHC RBC-ENTMCNC: 32.7 GM/DL — SIGNIFICANT CHANGE UP (ref 32–36)
MCV RBC AUTO: 88.8 FL — SIGNIFICANT CHANGE UP (ref 80–100)
MONOCYTES # BLD AUTO: 0.69 K/UL — SIGNIFICANT CHANGE UP (ref 0–0.9)
MONOCYTES NFR BLD AUTO: 8.9 % — SIGNIFICANT CHANGE UP (ref 2–14)
NEUTROPHILS # BLD AUTO: 5.34 K/UL — SIGNIFICANT CHANGE UP (ref 1.8–7.4)
NEUTROPHILS NFR BLD AUTO: 69.2 % — SIGNIFICANT CHANGE UP (ref 43–77)
PLATELET # BLD AUTO: 176 K/UL — SIGNIFICANT CHANGE UP (ref 150–400)
POTASSIUM SERPL-MCNC: 3.6 MMOL/L — SIGNIFICANT CHANGE UP (ref 3.5–5.3)
POTASSIUM SERPL-SCNC: 3.6 MMOL/L — SIGNIFICANT CHANGE UP (ref 3.5–5.3)
RBC # BLD: 5.07 M/UL — SIGNIFICANT CHANGE UP (ref 4.2–5.8)
RBC # FLD: 14.2 % — SIGNIFICANT CHANGE UP (ref 10.3–14.5)
SODIUM SERPL-SCNC: 142 MMOL/L — SIGNIFICANT CHANGE UP (ref 135–145)
WBC # BLD: 7.72 K/UL — SIGNIFICANT CHANGE UP (ref 3.8–10.5)
WBC # FLD AUTO: 7.72 K/UL — SIGNIFICANT CHANGE UP (ref 3.8–10.5)

## 2022-06-08 PROCEDURE — 93010 ELECTROCARDIOGRAM REPORT: CPT

## 2022-06-08 PROCEDURE — 99239 HOSP IP/OBS DSCHRG MGMT >30: CPT

## 2022-06-08 RX ORDER — APIXABAN 2.5 MG/1
5 TABLET, FILM COATED ORAL EVERY 12 HOURS
Refills: 0 | Status: DISCONTINUED | OUTPATIENT
Start: 2022-06-08 | End: 2022-06-08

## 2022-06-08 RX ORDER — ACETAMINOPHEN 500 MG
2 TABLET ORAL
Qty: 0 | Refills: 0 | DISCHARGE
Start: 2022-06-08

## 2022-06-08 RX ORDER — RIVAROXABAN 15 MG-20MG
1 KIT ORAL
Qty: 30 | Refills: 0
Start: 2022-06-08 | End: 2022-07-07

## 2022-06-08 RX ORDER — CLOPIDOGREL BISULFATE 75 MG/1
1 TABLET, FILM COATED ORAL
Qty: 0 | Refills: 0 | DISCHARGE

## 2022-06-08 RX ORDER — RIVAROXABAN 15 MG-20MG
1 KIT ORAL
Qty: 90 | Refills: 0
Start: 2022-06-08 | End: 2022-09-05

## 2022-06-08 RX ADMIN — AMLODIPINE BESYLATE 5 MILLIGRAM(S): 2.5 TABLET ORAL at 09:19

## 2022-06-08 RX ADMIN — PANTOPRAZOLE SODIUM 40 MILLIGRAM(S): 20 TABLET, DELAYED RELEASE ORAL at 06:02

## 2022-06-08 RX ADMIN — CLOPIDOGREL BISULFATE 75 MILLIGRAM(S): 75 TABLET, FILM COATED ORAL at 09:19

## 2022-06-08 RX ADMIN — Medication 1 TABLET(S): at 09:19

## 2022-06-08 RX ADMIN — Medication 75 MICROGRAM(S): at 06:02

## 2022-06-08 RX ADMIN — Medication 200 MILLIGRAM(S): at 09:19

## 2022-06-08 RX ADMIN — LOSARTAN POTASSIUM 100 MILLIGRAM(S): 100 TABLET, FILM COATED ORAL at 09:19

## 2022-06-08 NOTE — DISCHARGE NOTE PROVIDER - HOSPITAL COURSE
86 year old male patient with pertinent past medical history noted for afib/flutter and CAD presented to the ED complaining of profound fatigue in the setting of bradycardia - underwent PPM placement on 6/7/22 and being stable since in paced rhythm at 70s.  Now stable for DC    * Symptomatic Bradycardia in pt with Aflutter - SSS s/p PPM placement 6/7/22 - paced now, start AC xarelto    * Aflutter/fib - start AC xarelto due to cost    * HTN - cont losartan    * Hypothyroidism - levothyroxine    * GERD - PPI    Medically stable for DC

## 2022-06-08 NOTE — PROVIDER CONTACT NOTE (OTHER) - SITUATION
discharge instructions and education provided with verbal understanding. iv removed. into w/c to car
symptomatic bradycardia  Dr Khan spoke to Dr Kam,

## 2022-06-08 NOTE — DISCHARGE NOTE NURSING/CASE MANAGEMENT/SOCIAL WORK - NSDCPEFALRISK_GEN_ALL_CORE
For information on Fall & Injury Prevention, visit: [email or web address] OR  [email or web address] OR  https://www.cdc.gov/steadi/patient.html

## 2022-06-08 NOTE — DISCHARGE NOTE PROVIDER - NSDCCPCAREPLAN_GEN_ALL_CORE_FT
PRINCIPAL DISCHARGE DIAGNOSIS  Diagnosis: Symptomatic bradycardia  Assessment and Plan of Treatment: s/p PPM      SECONDARY DISCHARGE DIAGNOSES  Diagnosis: New onset atrial flutter  Assessment and Plan of Treatment: xarelto    Diagnosis: Bradycardia with 31-40 beats per minute  Assessment and Plan of Treatment: see above

## 2022-06-08 NOTE — CDI QUERY NOTE - NSCDIOTHERTXTBX_GEN_ALL_CORE_HH
Clinical documentation indicates that this patient has atrial fibrillation.  Please further specify:    A) Chronic atrial fibrillation  B) Paroxysmal atrial fibrillation   C) Permanent atrial fibrillation  D) Persistent atrial fibrillation  E) Other (please specify) ____________.  F) Unable to determine     SUPPORTING DOCUMENTATION AND/OR CLINICAL EVIDENCE:    ECG (05.18.22 @ 17:35) Sinus bradycardia with 2nd degree type 1 AV block    HP Adult 6/5/202  past medical history noted for afib/flutter    Electrophysiology consult note 6/6/2022  Patient with sick sinus syndrome and would benefit from pacemaker    Cardiology progress note 6/7/2022  Still in a. flutter on the monitor although the vent. rate is 40's-50's.     Adult-hospitalist note 6/7/2022  Symptomatic Bradycardia in pt with Aflutter - SSS s/p PPM placement today - paced now, start AC tomorrow

## 2022-06-08 NOTE — PROGRESS NOTE ADULT - ASSESSMENT
6/6/22:  Pt with above history and symptomatic bradycardia now with A. Flutter with slow VR (Ventricular Response) off low dose Propranolol.  Will need a PPM and may need to consider RENE cardioversion after.  Will have EP seen the pt.  He did well on Propranolol both for hs BP and essential tremors but will need to hold that for now.  Agree with Heparin and will ultimately need Eliquis and consider stopping the Plavix.  Continue as outlined by medicine and EP etal.  Will follow.    6/7/22:  Feeling ok.  For PPM today.  Still in a. flutter on the monitor although the vent. rate is 40's-50's.  May need RENE cardioversion in the future but previously he converted spontaneously on meds.  Will need long term oral AC (Eliquis) after the PPM (tomorrow) and will stay off ASA and Plavix for now.  No other new cardiac changes.  Continue as outlined by medicine and EP etal.  Will follow.    6/8/22:  Tolerated PPM placement.  A-V pacing on the monitor at 70 bpm.  Overall feeling good.  Concerned about the cost of Eliquis (>$500/mo).  Xarelto is about $120/mo.  Does not want Coumadin.  Suggest Xarelto 20 mg daily and probably stay off Plavix and ASA while on Xarelto for now.  No new cardiac symptoms.  Home possibly today.  He already has an appt to see me next week.  Other plans as per medicine and EP.

## 2022-06-08 NOTE — DISCHARGE NOTE NURSING/CASE MANAGEMENT/SOCIAL WORK - PATIENT PORTAL LINK FT
You can access the FollowMyHealth Patient Portal offered by [facility] by registering at the following website: [email or web address]. By joining [facility]’s FollowMyHealth portal, you will also be able to view your health information using other applications (apps) compatible with our system.

## 2022-06-08 NOTE — DISCHARGE NOTE PROVIDER - NSDCMRMEDTOKEN_GEN_ALL_CORE_FT
acetaminophen 325 mg oral tablet: 2 tab(s) orally every 6 hours, As needed, Temp greater or equal to 38C (100.4F), Mild Pain (1 - 3)  allopurinol 100 mg oral tablet: 2 tab(s) orally once a day  ALPRAZolam 1 mg oral tablet: 1 tab(s) orally every 8 hours, As Needed  amLODIPine 5 mg oral tablet: 1 tab(s) orally once a day  Co-Q10 100 mg oral capsule: 1 cap(s) orally once a day  Fish Oil 1000 mg oral capsule: 1 cap(s) orally once a day  levothyroxine 75 mcg (0.075 mg) oral tablet: 1 tab(s) orally once a day  Lipitor 20 mg oral tablet: 1 tab(s) orally once a day (at bedtime)  Multiple Vitamins oral tablet: 1 tab(s) orally once a day  olmesartan 40 mg oral tablet: 1 tab(s) orally once a day  pantoprazole 40 mg oral delayed release tablet: 1 tab(s) orally once a day  Vitamin D3 25 mcg (1000 intl units) oral tablet: 1 tab(s) orally once a day  Voltaren Arthritis Pain 1% topical gel: Apply topically to knees as needed for pain  Xarelto 20 mg oral tablet: 1 tab(s) orally once a day (in the evening)    acetaminophen 325 mg oral tablet: 2 tab(s) orally every 6 hours, As needed, Temp greater or equal to 38C (100.4F), Mild Pain (1 - 3)  allopurinol 100 mg oral tablet: 2 tab(s) orally once a day  ALPRAZolam 1 mg oral tablet: 1 tab(s) orally every 8 hours, As Needed  amLODIPine 5 mg oral tablet: 1 tab(s) orally once a day  Co-Q10 100 mg oral capsule: 1 cap(s) orally once a day  Fish Oil 1000 mg oral capsule: 1 cap(s) orally once a day  levothyroxine 75 mcg (0.075 mg) oral tablet: 1 tab(s) orally once a day  Lipitor 20 mg oral tablet: 1 tab(s) orally once a day (at bedtime)  Multiple Vitamins oral tablet: 1 tab(s) orally once a day  olmesartan 40 mg oral tablet: 1 tab(s) orally once a day  pantoprazole 40 mg oral delayed release tablet: 1 tab(s) orally once a day  rivaroxaban 20 mg oral tablet: 1 tab(s) orally once a day with dinner  Vitamin D3 25 mcg (1000 intl units) oral tablet: 1 tab(s) orally once a day  Voltaren Arthritis Pain 1% topical gel: Apply topically to knees as needed for pain

## 2022-06-08 NOTE — PROGRESS NOTE ADULT - SUBJECTIVE AND OBJECTIVE BOX
CHIEF COMPLAINT:  Patient is a 86y old  Male who presents with a chief complaint of Bradycardia (2022 22:20)      HPI: 22:  86 year old male patient with pertinent past medical history noted for afib/flutter and CAD presented to the ED complaining of profound fatigue in the setting of bradycardia. Of note, patient was previously seen and evaluated for similar presentation and had his home dose of Propanolol halved. States his heart rate remained persistently low despite not take propanolol for the past two days. Heart rate ranged from 34-42 on ambulatory monitoring. Patient without associated chest pain, palpitations, shortness of breath, dizziness, nausea or vomiting.  On telemetry monitoring in the ED patient with heart range typically in the 40s but occasionally dipped to the 30s.   EKG monitor showing A. Flutter with slow VR with rates in the 30's off Propranolol.    22:  Feeling ok.  For PPM today.  Still in a. flutter on the monitor although the vent. rate is 40's-50's.  May need RENE cardioversion in the future but previously he converted spontaneously on meds.  Will need long term oral AC (Eliquis) after the PPM (tomorrow) and will stay off ASA and Plavix for now.  No other new cardiac changes.    22:  Tolerated PPM placement.  A-V pacing on the monitor at 70 bpm.  Overall feeling good.  Concerned about the cost of Eliquis (>$500/mo).  Xarelto is about $120/mo.  Does not want Coumadin.  Suggest Xarelto 20 mg daily and probably stay off Plavix and ASA while on Xarelto for now.  No new cardiac symptoms.  Home possibly today.  He already has an appt to see me next week.        PMHx:  PAST MEDICAL & SURGICAL HISTORY:  Essential tremor  Benign prostatic hypertrophy  Hypertension  Hypercholesterolemia  Hypothyroid  Hemorrhoids  S/P tonsillectomy  History of coronary angiogram-  History of cataract surgery, right      FAMILY HISTORY:   FAMILY HISTORY:  No pertinent family history in first degree relatives      ALLERGIES:  Allergies  No Known Allergies      REVIEW OF SYSTEMS:  10 point ROS was obtained  Pertinent positives and negatives are as above  All other review of systems is negative unless indicated above      ICU Vital Signs Last 24 Hrs  T(C): 36 (2022 05:44), Max: 37.2 (2022 08:44)  T(F): 96.8 (2022 05:44), Max: 98.9 (2022 08:44)  HR: 70 (2022 06:00) (58 - 72)  BP: 136/83 (2022 06:00) (127/78 - 171/77)  BP(mean): 95 (2022 06:00) (84 - 105)  RR: 16 (2022 19:00) (12 - 18)  SpO2: 95% (2022 06:00) (92% - 100%)      I&O's Summary  2022 07:01  -  2022 07:00  --------------------------------------------------------  IN: 118 mL / OUT: 450 mL / NET: -332 mL        PHYSICAL EXAM:   Constitutional: NAD, awake and alert, well-developed  HEENT: PERR, EOMI, Normal Hearing, MMM  Neck: Soft and supple, No LAD, No JVD  Respiratory: Breath sounds are clear bilaterally, No wheezing, rales or rhonchi, PPM in left chest wall  Cardiovascular: S1 and S2, regular rate and rhythm, soft POONAM at LLSB and base as before, no gallops or rubs  Gastrointestinal: Bowel Sounds present, soft, nontender, nondistended, no guarding, no rebound  Extremities: No peripheral edema  Vascular: 2+ peripheral pulses  Neurological: A/O x 3, no focal deficits  Musculoskeletal: 5/5 strength b/l upper and lower extremities  Skin: No rashes      MEDICATIONS  (STANDING):  allopurinol  Oral Tab/Cap - Peds 200 milliGRAM(s) Oral daily  amLODIPine   Tablet 5 milliGRAM(s) Oral daily  atorvastatin 20 milliGRAM(s) Oral at bedtime  cholecalciferol 1000 Unit(s) Oral daily  clopidogrel Tablet 75 milliGRAM(s) Oral daily  heparin  Infusion.  Unit(s)/Hr (17 mL/Hr) IV Continuous <Continuous>  levothyroxine 75 MICROGram(s) Oral daily  losartan 100 milliGRAM(s) Oral daily  multivitamin 1 Tablet(s) Oral daily  pantoprazole    Tablet 40 milliGRAM(s) Oral before breakfast    MEDICATIONS  (PRN):  acetaminophen     Tablet .. 650 milliGRAM(s) Oral every 6 hours PRN Temp greater or equal to 38C (100.4F), Mild Pain (1 - 3)  aluminum hydroxide/magnesium hydroxide/simethicone Suspension 30 milliLiter(s) Oral every 4 hours PRN Dyspepsia  atropine Injectable 0.5 milliGRAM(s) IntraMuscular once PRN bradycardia  heparin   Injectable 7500 Unit(s) IV Push every 6 hours PRN For aPTT less than 40  heparin   Injectable 3500 Unit(s) IV Push every 6 hours PRN For aPTT between 40 - 57  melatonin 3 milliGRAM(s) Oral at bedtime PRN Insomnia  ondansetron Injectable 4 milliGRAM(s) IV Push every 8 hours PRN Nausea and/or Vomiting  zolpidem 5 milliGRAM(s) Oral at bedtime PRN Insomnia  zolpidem 5 milliGRAM(s) Oral at bedtime PRN Insomnia      LABS: All Labs Reviewed:                        14.7   7.72  )-----------( 176      ( 2022 05:18 )             45.0                           14.3   6.52  )-----------( 165      ( 2022 03:46 )             42.2                           13.9   7.10  )-----------( 170      ( 2022 05:43 )             41.8       06-08    142  |  110<H>  |  20  ----------------------------<  123<H>  3.6   |  24  |  1.06    Ca    8.9      2022 05:18      06-06    140  |  108  |  25<H>  ----------------------------<  131<H>  3.9   |  26  |  1.11    Ca    9.0      2022 05:43  Phos  3.3     06-06  Mg     2.0     06-06    TPro  5.8<L>  /  Alb  3.1<L>  /  TBili  0.8  /  DBili  x   /  AST  16  /  ALT  22  /  AlkPhos  69  06-06    PT/INR - ( 2022 05:43 )   PT: 12.7 sec;   INR: 1.09 ratio       PTT - ( 2022 05:43 )  PTT:129.4 sec    Serum Pro-Brain Natriuretic Peptide: 2052 pg/mL ( @ 14:59)    Troponin I, High Sensitivity (22 @ 21:15): 21.33  Troponin I, High Sensitivity (22 @ 18:01): 28.49  Troponin I, High Sensitivity (22 @ 14:59): 23.18    BLOOD CULTURES:   LIPID PROFILE     RADIOLOGY:    CXR: 22:  INTERPRETATION:  Clinical history: 86-year-old male, status post device.  Portable view of the chest is compared to 2022.  Pacemaker with wire tips in right atrium and right ventricle, new.  Normal cardiac silhouette and normal pulmonary vasculature with no consolidation, effusion, pneumothorax or acute osseous finding.  IMPRESSION:  Pacemaker with wire tips in the right atrium and right ventricle, new    CXR: 22:  INTERPRETATION:  AP erect chest on 2022 at 3:31 PM. Patient has symptomatic bradycardia and is scheduled for admission.  Heart magnified by technique.  Lungs remain clear.  Chest is similar to May 18 of this year.  IMPRESSION: No acute finding or change.      EK22:  A. Flutter with slow VR and bradycardia    TELEMETRY:  A. Flutter with slow VR    ECHO:  22:  M-Mode Measurements (cm)   LVEDd: 4.68 cm            LVESd: 3.18 cm   IVSEd: 1.1 cm   LVPWd: 1.09 cm            AO Root Dimension: 3.3 cm                             ACS: 1.8 cm                             LA: 4.6 cm  Doppler Measurements:   AV Velocity:146 cm/s               MV Peak E-Wave: 104 cm/s   AV Peak Gradient: 8.53 mmHg        MV Peak A-Wave: 51.8 cm/s                                      MV E/A Ratio: 2.01 %   TR Velocity:290 cm/s               MV Peak Gradient: 4.33 mmHg   TR Gradient:33.64 mmHg   Estimated RAP:5 mmHg   RVSP:39 mmHg    Findings  Mitral Valve   The mitral valve leaflets appear thin and normal.   Mild (1+) mitral regurgitation is present.    Aortic Valve   The aortic valve is well visualized, appears mildly calcified. Valve opening seems to be normal.    Tricuspid Valve   Normal appearing tricuspid valve structure.   Mild to moderate Tricuspid regurgitation is present.   Mild pulmonary hypertension.    Pulmonic Valve   Pulmonic valve not well seen.   Mild pulmonic valvular regurgitation (1+) is present.    Left Atrium   The left atrium is moderately dilated.    Left Ventricle   Estimated left ventricular ejection fraction is 60-65 %.   The left ventricle is normal in size, wall thickness, wall motion and contractility as seen in limited views.    Right Atrium   Normal appearing right atrium.    Right Ventricle   Normal appearing right ventricle structure and function.    Pericardial Effusion   No evidence of pericardial effusion.    Pleural Effusion   No evidence of pleural effusion.    Miscellaneous   All visualized extra cardiac structures appears to be normal.    Summary   Estimated left ventricular ejection fraction is 60-65 %.   The left ventricle is normal in size, wall thickness, wall motion and contractility as seen in limited views.   The left atrium is moderately dilated.   Normal appearing right ventricle structure and function.   The aortic valve is well visualized, appears mildly calcified. Valve opening seems to be normal.   The mitral valve leaflets appear thin and normal.   Mild (1+) mitral regurgitation is present.   Normal appearing tricuspid valve structure.   Mild to moderate Tricuspid regurgitation is present.   Mild pulmonary hypertension.   Pulmonic valve not well seen.   Mild pulmonic valvular regurgitation (1+) is present.   No evidence of pericardial effusion.    Signature   ----------------------------------------------------------------   Electronically signed by Darrion Sierra DO(Interpreting   physician) on 2022 11:12 AM   ----------------------------------------------------------------

## 2022-06-08 NOTE — PROGRESS NOTE ADULT - ASSESSMENT
ASSESSMENT AND PLAN  86yMale POD#1, Status Post Dual chamber pacemaker.  Patient denies any complaints of chest pain, SOB, dizziness, palpitations or any significant discomfort.       Device interrogation this AM reveals normal functions  CXR reviewed  ABX completed  ChadsVasc4, Start Eliquis 5mg q12H tonight  Post operative instructions reviewed with patient with verbalized understanding.  Patient is aware of left arm and driving precautions  Outpatient EP follow up in two weeks or sooner for symptoms or concerns  Stable for discharge from EP standpoint    ASSESSMENT AND PLAN  86yMale POD#1, Status Post Dual chamber pacemaker.  Patient denies any complaints of chest pain, SOB, dizziness, palpitations or any significant discomfort.       Device interrogation this AM reveals normal function. DDDR 60-120bpm, A paced <1%, vpaced 100%  CXR reviewed  ABX completed  ChadsVasc4, Start Xarelto 20mg tonight  Post operative instructions reviewed with patient with verbalized understanding.  Patient is aware of left arm and driving precautions  Outpatient EP follow up in two weeks or sooner for symptoms or concerns  Can consider outpatient cardioversion after 4 weeks of uninterrupted anticoagulation  Patient can resume Propranolol for tremor  Stable for discharge from EP standpoint  Plan discussed with patient, RN, hospitalist and Dr. Kam

## 2022-06-08 NOTE — PROGRESS NOTE ADULT - SUBJECTIVE AND OBJECTIVE BOX
HPI:  86 year old male patient with pertinent past medical history noted for afib/flutter and CAD presented to the ED complaining of profound fatigue in the setting of bradycardia. Of note, patient was previously seen and evaluated for similar presentation and had his home dose of Propanolol halved. States his heart rate remained persistently low despite not take propanolol for the past two days. Heart rate ranged from 34-42 on ambulatory monitoring. Patient without associated chest pain, palpitations, shortness of breath, dizziness, nausea or vomiting.    On telemetry monitoring in the ED patient with heart range typically in the 40s but occasionally dipped to the 30s. (05 Jun 2022 22:20)    6/8/22: Patient s/p dual chamber PPM POD #1.  Denies any CP, palpitations, SOB, dizziness, lightheadedness, significant pain at site  TELE:  EKG:    Vital Signs Last 24 Hrs  T(C): 36 (08 Jun 2022 05:44), Max: 36.8 (07 Jun 2022 10:19)  T(F): 96.8 (08 Jun 2022 05:44), Max: 98.2 (07 Jun 2022 10:19)  HR: 70 (08 Jun 2022 08:00) (70 - 72)  BP: 143/85 (08 Jun 2022 08:00) (127/78 - 166/88)  BP(mean): 98 (08 Jun 2022 08:00) (84 - 105)  RR: 16 (07 Jun 2022 19:00) (12 - 18)  SpO2: 99% (08 Jun 2022 08:00) (92% - 100%)    PHYSICAL EXAMINATION:  GENERAL: In no apparent distress, well nourished, and hydrated.  HEAD:  Atraumatic, Normocephalic  EYES: EOMI, PERRLA, conjunctiva and sclera clear  NECK: Supple and normal thyroid.  No JVD or carotid bruit.  Carotid pulse is 2+ bilaterally.  HEART: Regular rate and rhythm; No murmurs, rubs, or gallops. Left subclavicular incision C/D/I without drainage, ecchymosis or hematoma  PULMONARY: Clear to auscultation and perfusion.  No rales, wheezing, or rhonchi bilaterally.  ABDOMEN: Soft, Nontender, Nondistended; Bowel sounds present  EXTREMITIES:  2+ Peripheral Pulses, No clubbing, cyanosis, or edema  LYMPH: No lymphadenopathy noted  NEUROLOGICAL: Grossly nonfocal    Labs:  06-08    142  |  110<H>  |  20  ----------------------------<  123<H>  3.6   |  24  |  1.06    Ca    8.9      08 Jun 2022 05:18                          14.7   7.72  )-----------( 176      ( 08 Jun 2022 05:18 )             45.0       Radiology:  < from: Xray Chest 1 View- PORTABLE-Urgent (06.07.22 @ 11:36) >    IMPRESSION:    Pacemaker with wire tips in the right atrium and right ventricle, ne       HPI:  86 year old male patient with pertinent past medical history noted for afib/flutter and CAD presented to the ED complaining of profound fatigue in the setting of bradycardia. Of note, patient was previously seen and evaluated for similar presentation and had his home dose of Propanolol halved. States his heart rate remained persistently low despite not take propanolol for the past two days. Heart rate ranged from 34-42 on ambulatory monitoring. Patient without associated chest pain, palpitations, shortness of breath, dizziness, nausea or vomiting.    On telemetry monitoring in the ED patient with heart range typically in the 40s but occasionally dipped to the 30s. (05 Jun 2022 22:20)    6/8/22: Patient s/p dual chamber PPM POD #1.  Denies any CP, palpitations, SOB, dizziness, lightheadedness, significant pain at site  TELE: Vpaced 60s  EKG: Vpaced@70bpm  QRS: 148ms  Qt/QTc: 444/479ms    Vital Signs Last 24 Hrs  T(C): 36 (08 Jun 2022 05:44), Max: 36.8 (07 Jun 2022 10:19)  T(F): 96.8 (08 Jun 2022 05:44), Max: 98.2 (07 Jun 2022 10:19)  HR: 70 (08 Jun 2022 08:00) (70 - 72)  BP: 143/85 (08 Jun 2022 08:00) (127/78 - 166/88)  BP(mean): 98 (08 Jun 2022 08:00) (84 - 105)  RR: 16 (07 Jun 2022 19:00) (12 - 18)  SpO2: 99% (08 Jun 2022 08:00) (92% - 100%)    PHYSICAL EXAMINATION:  GENERAL: In no apparent distress, well nourished, and hydrated.  HEAD:  Atraumatic, Normocephalic  EYES: EOMI, PERRLA, conjunctiva and sclera clear  NECK: Supple and normal thyroid.  No JVD or carotid bruit.  Carotid pulse is 2+ bilaterally.  HEART: Regular rate and rhythm; No murmurs, rubs, or gallops. Left subclavicular incision C/D/I without drainage, ecchymosis or hematoma  PULMONARY: Clear to auscultation and perfusion.  No rales, wheezing, or rhonchi bilaterally.  ABDOMEN: Soft, Nontender, Nondistended; Bowel sounds present  EXTREMITIES:  2+ Peripheral Pulses, No clubbing, cyanosis, or edema  LYMPH: No lymphadenopathy noted  NEUROLOGICAL: Grossly nonfocal    Labs:  06-08    142  |  110<H>  |  20  ----------------------------<  123<H>  3.6   |  24  |  1.06    Ca    8.9      08 Jun 2022 05:18                          14.7   7.72  )-----------( 176      ( 08 Jun 2022 05:18 )             45.0       Radiology:  < from: Xray Chest 1 View- PORTABLE-Urgent (06.07.22 @ 11:36) >    IMPRESSION:    Pacemaker with wire tips in the right atrium and right ventricle, ne

## 2022-06-08 NOTE — DISCHARGE NOTE PROVIDER - CARE PROVIDER_API CALL
Chandan Haines)  Cardiovascular Disease; Internal Medicine  [address], [address]  [place] [address]  Phone: [phone]  Fax: [phone]  Follow Up Time:

## 2022-06-15 DIAGNOSIS — E03.9 HYPOTHYROIDISM, UNSPECIFIED: ICD-10-CM

## 2022-06-15 DIAGNOSIS — I49.5 SICK SINUS SYNDROME: ICD-10-CM

## 2022-06-15 DIAGNOSIS — Z79.02 LONG TERM (CURRENT) USE OF ANTITHROMBOTICS/ANTIPLATELETS: ICD-10-CM

## 2022-06-15 DIAGNOSIS — K21.9 GASTRO-ESOPHAGEAL REFLUX DISEASE WITHOUT ESOPHAGITIS: ICD-10-CM

## 2022-06-15 DIAGNOSIS — I25.10 ATHEROSCLEROTIC HEART DISEASE OF NATIVE CORONARY ARTERY WITHOUT ANGINA PECTORIS: ICD-10-CM

## 2022-06-15 DIAGNOSIS — I48.0 PAROXYSMAL ATRIAL FIBRILLATION: ICD-10-CM

## 2022-06-15 DIAGNOSIS — I48.92 UNSPECIFIED ATRIAL FLUTTER: ICD-10-CM

## 2022-06-15 DIAGNOSIS — E78.5 HYPERLIPIDEMIA, UNSPECIFIED: ICD-10-CM

## 2022-06-15 DIAGNOSIS — G25.0 ESSENTIAL TREMOR: ICD-10-CM

## 2022-06-15 DIAGNOSIS — N40.0 BENIGN PROSTATIC HYPERPLASIA WITHOUT LOWER URINARY TRACT SYMPTOMS: ICD-10-CM

## 2022-06-15 DIAGNOSIS — I10 ESSENTIAL (PRIMARY) HYPERTENSION: ICD-10-CM

## 2022-06-21 ENCOUNTER — APPOINTMENT (OUTPATIENT)
Dept: ELECTROPHYSIOLOGY | Facility: CLINIC | Age: 87
End: 2022-06-21
Payer: MEDICARE

## 2022-06-21 ENCOUNTER — NON-APPOINTMENT (OUTPATIENT)
Age: 87
End: 2022-06-21

## 2022-06-21 VITALS
DIASTOLIC BLOOD PRESSURE: 78 MMHG | HEIGHT: 72 IN | RESPIRATION RATE: 16 BRPM | BODY MASS INDEX: 26.68 KG/M2 | SYSTOLIC BLOOD PRESSURE: 130 MMHG | HEART RATE: 60 BPM | OXYGEN SATURATION: 97 % | WEIGHT: 197 LBS

## 2022-06-21 PROCEDURE — 93280 PM DEVICE PROGR EVAL DUAL: CPT

## 2022-06-21 PROCEDURE — 99024 POSTOP FOLLOW-UP VISIT: CPT

## 2022-06-21 RX ORDER — OLMESARTAN MEDOXOMIL 40 MG/1
40 TABLET, FILM COATED ORAL DAILY
Refills: 0 | Status: ACTIVE | COMMUNITY
Start: 2022-06-21

## 2022-06-27 ENCOUNTER — APPOINTMENT (OUTPATIENT)
Dept: ORTHOPEDIC SURGERY | Facility: CLINIC | Age: 87
End: 2022-06-27

## 2022-06-27 PROCEDURE — 73630 X-RAY EXAM OF FOOT: CPT | Mod: LT

## 2022-06-27 PROCEDURE — 99204 OFFICE O/P NEW MOD 45 MIN: CPT

## 2022-06-27 NOTE — HISTORY OF PRESENT ILLNESS
[FreeTextEntry1] : The patient is a 86 year old male presenting with his spouse for an initial evaluation of his left foot swelling x 6 months. 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 He was treated for this at [facility] in [place] for Cellulitis with oral antibiotics. He denies admission to the hospital for IV antibiotics. He followed up with foot and ankle surgery in [place], where he was treated with a second oral antibiotic. He denies any improvement in swelling and appearance post second antibiotics. He was evaluated for this by his PCP in [place], where he treated for Gout with Naproxen and Prednisone, with improvement noted in swelling and pain. He confirms recent bloodwork, with his Uric Acid levels stated to decreased from 8.4 to approximately 5. He presents today due to continual swelling that has not improved. He denies any significant pain in the office today. The patient cannot attribute their pain to any injury, fall, or trauma.The patient receives injections to knees completed by Dr. Mahan, who referred the patient today for further assessment. History of pacemaker insertion in 2022 and negative US DVT of his LLE. He is wearing sneakers and is walking without assistance. No other complaints.

## 2022-06-27 NOTE — DISCUSSION/SUMMARY
[de-identified] : Today I had a lengthy discussion with the patient regarding their left foot swelling. I have addressed all the patient's concerns surrounding the pathology of their condition. XR imaging was completed in office today and results were reviewed with the patient. I recommend that the patient see a vascular specialist for further evaluation. The patient was referred to a vascular colleague in the office today. At this time, an MRI of the left foot was ordered so I can find out more about the etiology of the patient's condition. I advised to the patient to hold off on the MRI and to follow up with cardiology to assess if his pacemaker is MRI safe at this time. I did recommend that the patient utilize an compression sock PRN for swelling. We also discussed that there are no signs of active infection at this time. I did recommend continued follow-up with his PCP, and for possible further evaluation with nephrology.The patient understood and verbally agreed to the treatment plan. All of their questions were answered and they were satisfied with the visit. The patient should call the office if they have any questions or experience worsening symptoms.

## 2022-06-27 NOTE — PHYSICAL EXAM
[de-identified] : General: Alert and oriented x3. In no acute distress. Pleasant in nature with a normal affect. No apparent respiratory distress.\par \par Left Foot and Leg Exam\par Skin: Clean, dry, intact\par Inspection: No obvious malalignment, no masses, diffuse swelling, 2+ pitting edema in the left leg that travels down to the foot and ankle, no effusion\par Pulses: 2+ DP/PT pulses\par ROM: FOOT Full  ROM of digits, ANKLE 10 degrees of dorsiflexion, 30 degrees of plantarflexion, 10 degrees of subtalar motion.\par Painful ROM: None\par Tenderness: No tenderness over the medial malleolus, No tenderness over the lateral malleolus, no CFL/ATFL/PTFL pain, no deltoid ligament pain. No heel pain. No Achilles tenderness. No 5th metatarsal pain. No pain to the LisFranc joint. No ttp over the posterior tibial tendon.\par Stability: Negative anterior/posterior drawer.\par Strength: 5/5 ADD/ABD/TA/GS/EHL/FHL/EDL\par Neuro: Sensation in tact to light touch throughout\par Additional tests: Negative Mortons test, negative tarsal tunnel tinels, negative single heel rise.	 [de-identified] : 3V of the left foot were ordered, obtained, and reviewed by me today, 06/27/2022, revealed: No acute fractures. Skew foot.\par \par MRI of the left foot done on 2/02/2022  at [facility] results reviewed 06/27/2022 , results as reported in the chart:\par 1. Dorsal foot soft tissue swelling consistent with history of cellulitis. No discrete abscess. No appearance of osteomyelitis. \par 2. 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 \par

## 2022-06-27 NOTE — ADDENDUM
[FreeTextEntry1] : I, Isi Beverly, acted solely as a scribe for Dr. Darrion Gonzalez on this date 06/27/2022.\par \par All medical record entries made by the Scribe were at my, Dr. Darrion Gonzalez, direction and personally dictated by me on 06/27/2022 . I have reviewed the chart and agree that the record accurately reflects my personal performance of the history, physical exam, assessment and plan. I have also personally directed, reviewed, and agreed with the chart.	\par

## 2022-07-06 ENCOUNTER — APPOINTMENT (OUTPATIENT)
Dept: ELECTROPHYSIOLOGY | Facility: CLINIC | Age: 87
End: 2022-07-06

## 2022-07-06 VITALS
BODY MASS INDEX: 26.68 KG/M2 | RESPIRATION RATE: 16 BRPM | HEIGHT: 72 IN | SYSTOLIC BLOOD PRESSURE: 140 MMHG | OXYGEN SATURATION: 97 % | HEART RATE: 67 BPM | DIASTOLIC BLOOD PRESSURE: 82 MMHG | WEIGHT: 197 LBS

## 2022-07-06 PROCEDURE — 99024 POSTOP FOLLOW-UP VISIT: CPT

## 2022-07-07 ENCOUNTER — NON-APPOINTMENT (OUTPATIENT)
Age: 87
End: 2022-07-07

## 2022-10-10 ENCOUNTER — APPOINTMENT (OUTPATIENT)
Dept: ELECTROPHYSIOLOGY | Facility: CLINIC | Age: 87
End: 2022-10-10

## 2022-10-10 ENCOUNTER — NON-APPOINTMENT (OUTPATIENT)
Age: 87
End: 2022-10-10

## 2022-10-10 VITALS
HEIGHT: 72 IN | OXYGEN SATURATION: 100 % | WEIGHT: 197 LBS | RESPIRATION RATE: 16 BRPM | BODY MASS INDEX: 26.68 KG/M2 | SYSTOLIC BLOOD PRESSURE: 164 MMHG | DIASTOLIC BLOOD PRESSURE: 97 MMHG | HEART RATE: 66 BPM

## 2022-10-10 PROCEDURE — 93280 PM DEVICE PROGR EVAL DUAL: CPT

## 2022-10-10 RX ORDER — RIVAROXABAN 20 MG/1
20 TABLET, FILM COATED ORAL
Qty: 90 | Refills: 1 | Status: DISCONTINUED | COMMUNITY
End: 2022-10-10

## 2022-11-14 ENCOUNTER — APPOINTMENT (OUTPATIENT)
Dept: ORTHOPEDIC SURGERY | Facility: CLINIC | Age: 87
End: 2022-11-14

## 2022-11-14 DIAGNOSIS — M79.671 PAIN IN RIGHT FOOT: ICD-10-CM

## 2022-11-14 DIAGNOSIS — M25.572 PAIN IN LEFT ANKLE AND JOINTS OF LEFT FOOT: ICD-10-CM

## 2022-11-14 DIAGNOSIS — M76.822 POSTERIOR TIBIAL TENDINITIS, LEFT LEG: ICD-10-CM

## 2022-11-14 DIAGNOSIS — M79.89 OTHER SPECIFIED SOFT TISSUE DISORDERS: ICD-10-CM

## 2022-11-14 PROCEDURE — 99214 OFFICE O/P EST MOD 30 MIN: CPT

## 2022-11-14 PROCEDURE — 73610 X-RAY EXAM OF ANKLE: CPT | Mod: LT

## 2022-11-14 NOTE — DISCUSSION/SUMMARY
[de-identified] : Today I had a lengthy discussion with the patient regarding their left ankle pain. I have addressed all the patient's concerns surrounding the pathology of their condition. XR imaging was completed in office today and results were reviewed with the patient. I recommend that the patient utilize OTC Voltaren gel topically as directed. I recommend the patient undergo a course of physical therapy for the left ankle  2-3 times a week for a total of 8-12 weeks. A prescription was given for the physical therapy today. I recommended that the patient utilize an ASO brace. The patient was fitted for the ASO brace in the office today. The patient understood and verbally agreed to the treatment plan. All of their questions were answered and they were satisfied with the visit. The patient should call the office if they have any questions or experience worsening symptoms. I would like to see the patient back in the office in 6-8 weeks or after they return from [place] to reassess their condition. 				\par \par Discussed MRI. Patient deferred due to pacemaker.

## 2022-11-14 NOTE — ADDENDUM
[FreeTextEntry1] : I, Isi Beverly, acted solely as a scribe for Dr. Darrion Gonzalez on this date 11/14/2022.\par \par All medical record entries made by the Scribe were at my, Dr. Darrion Gonzalez, direction and personally dictated by me on 11/14/2022. I have reviewed the chart and agree that the record accurately reflects my personal performance of the history, physical exam, assessment and plan. I have also personally directed, reviewed, and agreed with the chart.	\par

## 2022-11-14 NOTE — PHYSICAL EXAM
[de-identified] : General: Alert and oriented x3. In no acute distress. Pleasant in nature with a normal affect. No apparent respiratory distress.\par \par Left Ankle Exam\par Skin: Clean, dry, intact\par Inspection: No obvious malalignment, + medial swelling, no effusion; no lymphadenopathy\par Pulses: 2+ DP/PT pulses\par ROM:10 degrees of dorsiflexion, 40 degrees of plantarflexion, 10 degrees of subtalar motion\par Tenderness: Medial ankle pain. Distal posterior tibial tendon pain. No tenderness over the lateral malleolus, no CFL/ATFL/PTFL pain. No medial malleolus pain, no deltoid ligament pain. No proximal fibular pain. No heel pain.\par Stability: Negative anterior/posterior drawer.\par Strength: 5/5 TA/GS/EHL\par Neuro: In tact to light touch throughout\par Additional tests: Negative Mortons test, Negative syndesmosis squeeze test. [de-identified] : 3V of the left ankle were ordered, obtained, and reviewed by me today, 11/14/2022, revealed: No acute fractures. \par

## 2022-12-14 ENCOUNTER — NON-APPOINTMENT (OUTPATIENT)
Age: 87
End: 2022-12-14

## 2022-12-14 DIAGNOSIS — Z86.39 PERSONAL HISTORY OF OTHER ENDOCRINE, NUTRITIONAL AND METABOLIC DISEASE: ICD-10-CM

## 2022-12-14 DIAGNOSIS — Z86.79 PERSONAL HISTORY OF OTHER DISEASES OF THE CIRCULATORY SYSTEM: ICD-10-CM

## 2022-12-14 DIAGNOSIS — M25.562 PAIN IN LEFT KNEE: ICD-10-CM

## 2022-12-14 DIAGNOSIS — F17.200 NICOTINE DEPENDENCE, UNSPECIFIED, UNCOMPLICATED: ICD-10-CM

## 2022-12-14 DIAGNOSIS — M25.561 PAIN IN RIGHT KNEE: ICD-10-CM

## 2022-12-14 RX ORDER — ELECTROLYTES/DEXTROSE
SOLUTION, ORAL ORAL DAILY
Refills: 0 | Status: ACTIVE | COMMUNITY

## 2022-12-14 RX ORDER — ATORVASTATIN CALCIUM 20 MG/1
20 TABLET, FILM COATED ORAL DAILY
Refills: 0 | Status: ACTIVE | COMMUNITY

## 2022-12-14 RX ORDER — PANTOPRAZOLE 40 MG/1
40 TABLET, DELAYED RELEASE ORAL
Refills: 0 | Status: ACTIVE | COMMUNITY

## 2022-12-22 ENCOUNTER — APPOINTMENT (OUTPATIENT)
Dept: ELECTROPHYSIOLOGY | Facility: CLINIC | Age: 87
End: 2022-12-22

## 2022-12-22 ENCOUNTER — NON-APPOINTMENT (OUTPATIENT)
Age: 87
End: 2022-12-22

## 2022-12-22 VITALS
SYSTOLIC BLOOD PRESSURE: 148 MMHG | BODY MASS INDEX: 27.5 KG/M2 | OXYGEN SATURATION: 100 % | WEIGHT: 203 LBS | DIASTOLIC BLOOD PRESSURE: 80 MMHG | HEART RATE: 64 BPM | HEIGHT: 72 IN

## 2022-12-22 PROCEDURE — 93280 PM DEVICE PROGR EVAL DUAL: CPT

## 2022-12-22 RX ORDER — OLMESARTAN MEDOXOMIL 40 MG/1
40 TABLET, FILM COATED ORAL DAILY
Refills: 0 | Status: DISCONTINUED | COMMUNITY
End: 2022-12-22

## 2022-12-22 RX ORDER — LEVOTHYROXINE SODIUM 75 UG/1
75 TABLET ORAL
Refills: 0 | Status: DISCONTINUED | COMMUNITY
End: 2022-12-22

## 2023-03-28 ENCOUNTER — APPOINTMENT (OUTPATIENT)
Dept: ELECTROPHYSIOLOGY | Facility: CLINIC | Age: 88
End: 2023-03-28
Payer: MEDICARE

## 2023-03-29 ENCOUNTER — NON-APPOINTMENT (OUTPATIENT)
Age: 88
End: 2023-03-29

## 2023-03-29 PROCEDURE — 93294 REM INTERROG EVL PM/LDLS PM: CPT

## 2023-03-29 PROCEDURE — 93296 REM INTERROG EVL PM/IDS: CPT

## 2023-06-06 ENCOUNTER — APPOINTMENT (OUTPATIENT)
Dept: ELECTROPHYSIOLOGY | Facility: CLINIC | Age: 88
End: 2023-06-06
Payer: MEDICARE

## 2023-06-06 ENCOUNTER — NON-APPOINTMENT (OUTPATIENT)
Age: 88
End: 2023-06-06

## 2023-06-06 VITALS
BODY MASS INDEX: 27.5 KG/M2 | DIASTOLIC BLOOD PRESSURE: 88 MMHG | HEIGHT: 72 IN | WEIGHT: 203 LBS | HEART RATE: 62 BPM | RESPIRATION RATE: 16 BRPM | OXYGEN SATURATION: 100 % | SYSTOLIC BLOOD PRESSURE: 161 MMHG

## 2023-06-06 PROCEDURE — 93280 PM DEVICE PROGR EVAL DUAL: CPT

## 2023-06-06 RX ORDER — APIXABAN 5 MG/1
5 TABLET, FILM COATED ORAL
Qty: 60 | Refills: 0 | Status: ACTIVE | COMMUNITY

## 2023-09-06 ENCOUNTER — NON-APPOINTMENT (OUTPATIENT)
Age: 88
End: 2023-09-06

## 2023-09-06 ENCOUNTER — APPOINTMENT (OUTPATIENT)
Dept: ELECTROPHYSIOLOGY | Facility: CLINIC | Age: 88
End: 2023-09-06
Payer: MEDICARE

## 2023-09-06 VITALS
HEART RATE: 70 BPM | DIASTOLIC BLOOD PRESSURE: 97 MMHG | BODY MASS INDEX: 27.5 KG/M2 | HEIGHT: 72 IN | WEIGHT: 203 LBS | OXYGEN SATURATION: 100 % | SYSTOLIC BLOOD PRESSURE: 155 MMHG

## 2023-09-06 PROCEDURE — 93280 PM DEVICE PROGR EVAL DUAL: CPT

## 2023-09-08 ENCOUNTER — NON-APPOINTMENT (OUTPATIENT)
Age: 88
End: 2023-09-08

## 2023-09-08 ENCOUNTER — APPOINTMENT (OUTPATIENT)
Dept: NEUROLOGY | Facility: CLINIC | Age: 88
End: 2023-09-08
Payer: MEDICARE

## 2023-09-08 VITALS
SYSTOLIC BLOOD PRESSURE: 125 MMHG | HEIGHT: 72 IN | BODY MASS INDEX: 27.09 KG/M2 | WEIGHT: 200 LBS | TEMPERATURE: 98.2 F | DIASTOLIC BLOOD PRESSURE: 81 MMHG | HEART RATE: 71 BPM

## 2023-09-08 DIAGNOSIS — F51.04 PSYCHOPHYSIOLOGIC INSOMNIA: ICD-10-CM

## 2023-09-08 PROCEDURE — 95806 SLEEP STUDY UNATT&RESP EFFT: CPT

## 2023-09-08 PROCEDURE — 99204 OFFICE O/P NEW MOD 45 MIN: CPT

## 2023-09-08 NOTE — PHYSICAL EXAM
[FreeTextEntry1] : Examination: Constitutional: normal, no apparent distress Eyes: normal conjunctiva b/l, no ptosis, visual fields full Respiratory: no respiratory distress, normal effort, normal auscultation Cardiovascular: normal rate, rhythm, no murmurs Neck: supple, no masses Vascular: carotids normal Skin: normal color, no rashes Psych: normal mood, affect  Neurological: Memory: oriented to person, place, month, day of the week and year. He does not know the exact date. Language intact/no aphasia Cranial Nerves: II-XII intact, Pupils equally round and reactive to light, ocular muscles/movements intact, no ptosis, no facial weakness, tongue protrudes normally in the midline,  Motor: normal tone, no pronator drift, full strength in all four extremities in the proximal and distal muscle groups No bradykinesia or rigidity Coordination: High frequency tremor in both hands with extension, b/l intention tremor Sensory: intact to light touch, joint position sense, decreased vibration in both feet and ankles DTRs: symmetric, trace in b/l triceps, trace in b/l biceps, trace in b/l brachioradialis, absent in bilateral patellars, absent in bilateral Achilles, Babinskis negative bilaterally Gait: narrow based, steady

## 2023-09-08 NOTE — HISTORY OF PRESENT ILLNESS
[FreeTextEntry1] : Mr. Vallejo is here today for neurology evaluation. He is here today with his wife. He reports that for a long time he feels foggy. He feels lethargic/sleepy. He sleeps through the night other than some disruptions to use the bathroom. He says that he is sleeping for 6-7 hours per night. He does not feel well rested when he wakes up in the morning.  HIs wife will go out and do errands and he will take a nap. he does fall asleep in the car when his wife drives. His wife says that snoring is minimal. His wife does not note pauses in his breathing during sleep.  He often takes zolpidem (5 mg) or alprazolam (0.25 mg) to help him fall asleep.  Sometimes he has difficulty finding words. [facility] A MoCA was done. His score was 24/30.  He also reports tremors. He was previously diagnosed with essential tremors. He says that he has had tremors for "a very long time". He is not aware of any family history of tremors. He reports worsening over time.  He takes low dose propranolol (10 mg bid) for high blood pressure. His wife says that his tremors improve when he has a drink.  He has not noticed any change with caffeine.  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  His Troy Sleepiness Scale Score is 9/24.

## 2023-09-08 NOTE — DATA REVIEWED
[de-identified] : labs 6/9/23: vitamin 12 972, folate > 22.3, TSH 2.14  CT head 7/14/23: No evidence  of acute intracranial hemorrhage, midline shift or mass effect. Mild parenchymal volume loss. Mild nonspecific periventricular hypodensity may be related to small vessel disease.  [de-identified] : MRI brain and pituitary with and without contrast 6/29/21: 4 mm pituitary microadenoma. Mild chronic microvascular ischemic changes.

## 2023-09-08 NOTE — DISCUSSION/SUMMARY
[FreeTextEntry1] : Mr. Vallejo is an 87 year old man with complaints of mental fogginess, daytime sleepiness, insomnia and tremor.   Mild Cognitive Impairment -[facility], consistent with mild cognitive impairment. -There has not been any suggestion of ventriculomegaly on prior imaging. -Vitamin B12 and TSH levels are normal -I do not think he needs medication such as donepezil at this time. - We discussed the importance of staying mentally and physically active. We also discussed the importance of good sleep and healthy (Mediterranean) diet. Strategies such as a weekly pill box and reminders should be used to compensate for memory loss.  Daytime sleepiness -He has chronic daytime sleepiness -Check HST to evaluate for possible ANGELA   Insomnia We discussed the following recommendations to improve sleep hygiene and insomnia. - The bed should only be used for sleep and intimacy. No reading or watching television in bed. -  Avoid trying to sleep/go to bed only when sleepy. If you cannot fall asleep at the beginning of the night or in the middle of the night get out of bed after 15 minutes and do something relaxing (read, watch television, etc.) -  Wake up at the same time every day. -  No naps during the day -  No caffeine after noon  -  Do not watch the clock at night. - Avoid direct sunlight late in the day/ wear sunglasses after 4 PM - Do not use the computer/tablets for 1-2 hours prior to bedtime - Schedule thinking time early in the evening and have relaxation time for one hour before bed - Practice relaxation training that can be done at night if you cannot sleep - Exercise for 20 minutes in the late afternoon/early evening or take a hot bath 2-4 hours before bedtime  My hope is that he can eventually decrease his reliance on Zolpidem and alprazolam  Tremors -Exam is consistent with benign essential tremor -He is taking very low dose propranolol. -He does not wish to increase the dose of propranolol or add other medications at this time. -Discussed potential exacerbating and alleviating factors.  f/u after sleep study, sooner if needed

## 2023-09-08 NOTE — CONSULT LETTER
[Dear  ___] : Dear  [unfilled], [Consult Letter:] : I had the pleasure of evaluating your patient, [unfilled]. [Please see my note below.] : Please see my note below. [Consult Closing:] : Thank you very much for allowing me to participate in the care of this patient.  If you have any questions, please do not hesitate to contact me. [FreeTextEntry2] : Aura Guevara [FreeTextEntry3] : Sincerely,   Ellie Carias MD Diplomate, American Academy of Psychiatry and Neurology Board Certified in the Subspecialty of Clinical Neurophysiology Board Certified in the Subspecialty of Sleep Medicine Board Certified in the Subspecialty of Epilepsy [DrSanjana  ___] : Dr. BOURGEOIS

## 2023-09-12 ENCOUNTER — NON-APPOINTMENT (OUTPATIENT)
Age: 88
End: 2023-09-12

## 2023-09-15 ENCOUNTER — EMERGENCY (EMERGENCY)
Facility: HOSPITAL | Age: 88
LOS: 0 days | Discharge: HOME | End: 2023-09-16
Attending: HOSPITALIST
Payer: MEDICARE

## 2023-09-15 VITALS
HEIGHT: 72 IN | WEIGHT: 199.96 LBS | DIASTOLIC BLOOD PRESSURE: 85 MMHG | HEART RATE: 60 BPM | RESPIRATION RATE: 17 BRPM | TEMPERATURE: 98 F | OXYGEN SATURATION: 98 % | SYSTOLIC BLOOD PRESSURE: 154 MMHG

## 2023-09-15 DIAGNOSIS — R00.1 BRADYCARDIA, UNSPECIFIED: ICD-10-CM

## 2023-09-15 DIAGNOSIS — Z98.89 OTHER SPECIFIED POSTPROCEDURAL STATES: Chronic | ICD-10-CM

## 2023-09-15 DIAGNOSIS — E78.00 PURE HYPERCHOLESTEROLEMIA, UNSPECIFIED: ICD-10-CM

## 2023-09-15 DIAGNOSIS — I48.91 UNSPECIFIED ATRIAL FIBRILLATION: ICD-10-CM

## 2023-09-15 DIAGNOSIS — Z98.41 CATARACT EXTRACTION STATUS, RIGHT EYE: ICD-10-CM

## 2023-09-15 DIAGNOSIS — E03.9 HYPOTHYROIDISM, UNSPECIFIED: ICD-10-CM

## 2023-09-15 DIAGNOSIS — Z95.0 PRESENCE OF CARDIAC PACEMAKER: ICD-10-CM

## 2023-09-15 DIAGNOSIS — Z79.01 LONG TERM (CURRENT) USE OF ANTICOAGULANTS: ICD-10-CM

## 2023-09-15 DIAGNOSIS — Z90.89 ACQUIRED ABSENCE OF OTHER ORGANS: ICD-10-CM

## 2023-09-15 DIAGNOSIS — I10 ESSENTIAL (PRIMARY) HYPERTENSION: ICD-10-CM

## 2023-09-15 DIAGNOSIS — R00.2 PALPITATIONS: ICD-10-CM

## 2023-09-15 PROCEDURE — 36415 COLL VENOUS BLD VENIPUNCTURE: CPT

## 2023-09-15 PROCEDURE — 85025 COMPLETE CBC W/AUTO DIFF WBC: CPT

## 2023-09-15 PROCEDURE — 84484 ASSAY OF TROPONIN QUANT: CPT

## 2023-09-15 PROCEDURE — 99285 EMERGENCY DEPT VISIT HI MDM: CPT | Mod: 25

## 2023-09-15 PROCEDURE — 71045 X-RAY EXAM CHEST 1 VIEW: CPT

## 2023-09-15 PROCEDURE — 99285 EMERGENCY DEPT VISIT HI MDM: CPT

## 2023-09-15 PROCEDURE — 93005 ELECTROCARDIOGRAM TRACING: CPT

## 2023-09-15 PROCEDURE — 85610 PROTHROMBIN TIME: CPT

## 2023-09-15 PROCEDURE — 85730 THROMBOPLASTIN TIME PARTIAL: CPT

## 2023-09-15 PROCEDURE — 93010 ELECTROCARDIOGRAM REPORT: CPT

## 2023-09-15 PROCEDURE — 80053 COMPREHEN METABOLIC PANEL: CPT

## 2023-09-15 PROCEDURE — 84443 ASSAY THYROID STIM HORMONE: CPT

## 2023-09-15 NOTE — ED ADULT TRIAGE NOTE - CHIEF COMPLAINT QUOTE
patient state he had a PPM placed by 1 year ago.  For the past week he noticed his HR dropping to the 30's on a pulse ox.  patient followed up with Dr. Haines who advised patient to come to ED if noticed low HR again.  patient denies symptoms, states she routinely checks his pulse since PPM placed.  HR at triaged notice to drop to 36 bpm.

## 2023-09-16 VITALS
OXYGEN SATURATION: 100 % | SYSTOLIC BLOOD PRESSURE: 152 MMHG | RESPIRATION RATE: 16 BRPM | DIASTOLIC BLOOD PRESSURE: 81 MMHG | HEART RATE: 62 BPM

## 2023-09-16 LAB
ALBUMIN SERPL ELPH-MCNC: 3.5 G/DL — SIGNIFICANT CHANGE UP (ref 3.3–5)
ALP SERPL-CCNC: 95 U/L — SIGNIFICANT CHANGE UP (ref 40–120)
ALT FLD-CCNC: 25 U/L — SIGNIFICANT CHANGE UP (ref 12–78)
ANION GAP SERPL CALC-SCNC: 8 MMOL/L — SIGNIFICANT CHANGE UP (ref 5–17)
APTT BLD: 36.5 SEC — HIGH (ref 24.5–35.6)
AST SERPL-CCNC: 19 U/L — SIGNIFICANT CHANGE UP (ref 15–37)
BASOPHILS # BLD AUTO: 0.03 K/UL — SIGNIFICANT CHANGE UP (ref 0–0.2)
BASOPHILS NFR BLD AUTO: 0.4 % — SIGNIFICANT CHANGE UP (ref 0–2)
BILIRUB SERPL-MCNC: 0.6 MG/DL — SIGNIFICANT CHANGE UP (ref 0.2–1.2)
BUN SERPL-MCNC: 23 MG/DL — SIGNIFICANT CHANGE UP (ref 7–23)
CALCIUM SERPL-MCNC: 9.1 MG/DL — SIGNIFICANT CHANGE UP (ref 8.5–10.1)
CHLORIDE SERPL-SCNC: 108 MMOL/L — SIGNIFICANT CHANGE UP (ref 96–108)
CO2 SERPL-SCNC: 24 MMOL/L — SIGNIFICANT CHANGE UP (ref 22–31)
CREAT SERPL-MCNC: 1.2 MG/DL — SIGNIFICANT CHANGE UP (ref 0.5–1.3)
EGFR: 59 ML/MIN/1.73M2 — LOW
EOSINOPHIL # BLD AUTO: 0.15 K/UL — SIGNIFICANT CHANGE UP (ref 0–0.5)
EOSINOPHIL NFR BLD AUTO: 1.9 % — SIGNIFICANT CHANGE UP (ref 0–6)
GLUCOSE SERPL-MCNC: 144 MG/DL — HIGH (ref 70–99)
HCT VFR BLD CALC: 45.3 % — SIGNIFICANT CHANGE UP (ref 39–50)
HGB BLD-MCNC: 15.6 G/DL — SIGNIFICANT CHANGE UP (ref 13–17)
IMM GRANULOCYTES NFR BLD AUTO: 0.2 % — SIGNIFICANT CHANGE UP (ref 0–0.9)
INR BLD: 1.18 RATIO — SIGNIFICANT CHANGE UP (ref 0.85–1.18)
LYMPHOCYTES # BLD AUTO: 1.58 K/UL — SIGNIFICANT CHANGE UP (ref 1–3.3)
LYMPHOCYTES # BLD AUTO: 19.5 % — SIGNIFICANT CHANGE UP (ref 13–44)
MCHC RBC-ENTMCNC: 30.1 PG — SIGNIFICANT CHANGE UP (ref 27–34)
MCHC RBC-ENTMCNC: 34.4 GM/DL — SIGNIFICANT CHANGE UP (ref 32–36)
MCV RBC AUTO: 87.5 FL — SIGNIFICANT CHANGE UP (ref 80–100)
MONOCYTES # BLD AUTO: 0.64 K/UL — SIGNIFICANT CHANGE UP (ref 0–0.9)
MONOCYTES NFR BLD AUTO: 7.9 % — SIGNIFICANT CHANGE UP (ref 2–14)
NEUTROPHILS # BLD AUTO: 5.67 K/UL — SIGNIFICANT CHANGE UP (ref 1.8–7.4)
NEUTROPHILS NFR BLD AUTO: 70.1 % — SIGNIFICANT CHANGE UP (ref 43–77)
PLATELET # BLD AUTO: 170 K/UL — SIGNIFICANT CHANGE UP (ref 150–400)
POTASSIUM SERPL-MCNC: 3.9 MMOL/L — SIGNIFICANT CHANGE UP (ref 3.5–5.3)
POTASSIUM SERPL-SCNC: 3.9 MMOL/L — SIGNIFICANT CHANGE UP (ref 3.5–5.3)
PROT SERPL-MCNC: 6.7 GM/DL — SIGNIFICANT CHANGE UP (ref 6–8.3)
PROTHROM AB SERPL-ACNC: 13.3 SEC — HIGH (ref 9.5–13)
RBC # BLD: 5.18 M/UL — SIGNIFICANT CHANGE UP (ref 4.2–5.8)
RBC # FLD: 14 % — SIGNIFICANT CHANGE UP (ref 10.3–14.5)
SODIUM SERPL-SCNC: 140 MMOL/L — SIGNIFICANT CHANGE UP (ref 135–145)
TROPONIN I, HIGH SENSITIVITY RESULT: 15.51 NG/L — SIGNIFICANT CHANGE UP
TSH SERPL-MCNC: 2.99 UU/ML — SIGNIFICANT CHANGE UP (ref 0.34–4.82)
WBC # BLD: 8.09 K/UL — SIGNIFICANT CHANGE UP (ref 3.8–10.5)
WBC # FLD AUTO: 8.09 K/UL — SIGNIFICANT CHANGE UP (ref 3.8–10.5)

## 2023-09-16 PROCEDURE — 71045 X-RAY EXAM CHEST 1 VIEW: CPT | Mod: 26

## 2023-09-16 NOTE — ED PROVIDER NOTE - PHYSICAL EXAMINATION
***GEN - NAD; well appearing; A+O x3 ***HEAD - NC/AT ***EYES/NOSE - PERRL, EOMI, mucous membranes moist, no discharge ***THROAT: Oral cavity and pharynx normal. No inflammation, swelling, exudate, or lesions.  ***NECK: Neck supple, non-tender   ***PULMONARY - CTA b/l, symmetric breath sounds. ***CARDIAC -s1s2, irregularly irregular, rate controlled, no M,G,R  ***ABDOMEN - +BS, ND, NT, soft, no guarding, no rebound, no masses   ***BACK - no CVA tenderness, Normal  spine ***EXTREMITIES - symmetric pulses, 2+ dp, capillary refill < 2 seconds, no clubbing ***SKIN - no rash or bruising   ***NEUROLOGIC - alert, CN 2-12 intact

## 2023-09-16 NOTE — ED PROVIDER NOTE - OBJECTIVE STATEMENT
87-year-old male with history of A-fib/a flutter presenting with bradycardia.  Patient states he is asymptomatic but notes that his heart rate goes down to the 20s and 30s.  He finds this concerning because he has a pacemaker and knows that the lowest setting and should be at a 60 bpm.  Patient states that he has been checking his numbers on his pulse oximeter at home.

## 2023-09-16 NOTE — ED PROVIDER NOTE - CLINICAL SUMMARY MEDICAL DECISION MAKING FREE TEXT BOX
87-year-old male with asymptomatic bradycardia.  Here on the cardiac monitor patient has a heart rate in the 70s pulse ox lying heart rate in the 30s.  Performed manual heart rate exam which is matching to the cardiac monitor.  Patient likely obtains an erroneous value at home which sometimes happens while in A-fib and using pulse oximeter to obtain heart rate.  Will obtain labs and EKG.    All work-up within normal limits. Outpatient follow-up with his cardiologist.

## 2023-09-16 NOTE — ED PROVIDER NOTE - NSFOLLOWUPINSTRUCTIONS_ED_ALL_ED_FT
Please follow up with your Cardiologist in the next few days  Please return for any new or concerning symptoms.

## 2023-09-16 NOTE — ED ADULT NURSE NOTE - NSFALLHARMRISKINTERV_ED_ALL_ED

## 2023-09-16 NOTE — ED ADULT NURSE NOTE - NS ED NOTE ABUSE RESPONSE YN
Low up with thyroid test show that his thyroid is extremely low. Needs to start levothyroxine 50 mcg taken first thing in the morning on an empty stomach with water with no food to be eaten until 30 to 60 minutes later.   We will need to recheck a TSH in 1 month
Yes

## 2023-09-16 NOTE — ED PROVIDER NOTE - PATIENT PORTAL LINK FT
You can access the FollowMyHealth Patient Portal offered by [facility] by registering at the following website: [email or web address]. By joining [facility]’s FollowMyHealth portal, you will also be able to view your health information using other applications (apps) compatible with our system.

## 2023-09-25 ENCOUNTER — NON-APPOINTMENT (OUTPATIENT)
Age: 88
End: 2023-09-25

## 2023-10-13 ENCOUNTER — NON-APPOINTMENT (OUTPATIENT)
Age: 88
End: 2023-10-13

## 2023-10-19 ENCOUNTER — APPOINTMENT (OUTPATIENT)
Dept: ELECTROPHYSIOLOGY | Facility: CLINIC | Age: 88
End: 2023-10-19
Payer: MEDICARE

## 2023-10-19 VITALS
RESPIRATION RATE: 16 BRPM | SYSTOLIC BLOOD PRESSURE: 157 MMHG | HEIGHT: 72 IN | OXYGEN SATURATION: 100 % | WEIGHT: 205 LBS | DIASTOLIC BLOOD PRESSURE: 96 MMHG | HEART RATE: 70 BPM | BODY MASS INDEX: 27.77 KG/M2

## 2023-10-19 PROCEDURE — 93280 PM DEVICE PROGR EVAL DUAL: CPT

## 2023-11-06 RX ORDER — OMEGA-3 ACID ETHYL ESTERS 1 G
1 CAPSULE ORAL
Qty: 0 | Refills: 0 | DISCHARGE

## 2023-11-06 RX ORDER — DICLOFENAC SODIUM 30 MG/G
0 GEL TOPICAL
Qty: 0 | Refills: 0 | DISCHARGE

## 2023-11-06 RX ORDER — UBIDECARENONE 100 MG
1 CAPSULE ORAL
Qty: 0 | Refills: 0 | DISCHARGE

## 2023-11-06 RX ORDER — ALPRAZOLAM 0.25 MG
1 TABLET ORAL
Qty: 0 | Refills: 0 | DISCHARGE

## 2023-11-07 ENCOUNTER — OUTPATIENT (OUTPATIENT)
Dept: OUTPATIENT SERVICES | Facility: HOSPITAL | Age: 88
LOS: 1 days | End: 2023-11-07
Payer: MEDICARE

## 2023-11-07 DIAGNOSIS — Z98.89 OTHER SPECIFIED POSTPROCEDURAL STATES: Chronic | ICD-10-CM

## 2023-11-07 PROCEDURE — 93005 ELECTROCARDIOGRAM TRACING: CPT

## 2023-11-07 PROCEDURE — 80048 BASIC METABOLIC PNL TOTAL CA: CPT

## 2023-11-07 PROCEDURE — 36415 COLL VENOUS BLD VENIPUNCTURE: CPT

## 2023-11-07 PROCEDURE — 85027 COMPLETE CBC AUTOMATED: CPT

## 2023-11-07 RX ORDER — PANTOPRAZOLE SODIUM 20 MG/1
1 TABLET, DELAYED RELEASE ORAL
Qty: 0 | Refills: 0 | DISCHARGE

## 2023-11-07 RX ORDER — ALLOPURINOL 300 MG
2 TABLET ORAL
Qty: 0 | Refills: 0 | DISCHARGE

## 2023-11-07 RX ORDER — LEVOTHYROXINE SODIUM 125 MCG
1 TABLET ORAL
Qty: 0 | Refills: 0 | DISCHARGE

## 2023-11-07 RX ORDER — CHOLECALCIFEROL (VITAMIN D3) 125 MCG
1 CAPSULE ORAL
Qty: 0 | Refills: 0 | DISCHARGE

## 2023-11-07 RX ORDER — ATORVASTATIN CALCIUM 80 MG/1
1 TABLET, FILM COATED ORAL
Qty: 0 | Refills: 0 | DISCHARGE

## 2023-11-07 RX ORDER — OLMESARTAN MEDOXOMIL 5 MG/1
1 TABLET, FILM COATED ORAL
Qty: 0 | Refills: 0 | DISCHARGE

## 2023-11-07 RX ORDER — AMLODIPINE BESYLATE 2.5 MG/1
1 TABLET ORAL
Qty: 0 | Refills: 0 | DISCHARGE

## 2023-11-09 ENCOUNTER — APPOINTMENT (OUTPATIENT)
Dept: ORTHOPEDIC SURGERY | Facility: CLINIC | Age: 88
End: 2023-11-09
Payer: MEDICARE

## 2023-11-09 VITALS — WEIGHT: 205 LBS | HEIGHT: 72 IN | BODY MASS INDEX: 27.77 KG/M2

## 2023-11-09 PROCEDURE — 99213 OFFICE O/P EST LOW 20 MIN: CPT | Mod: 25

## 2023-11-09 PROCEDURE — 20611 DRAIN/INJ JOINT/BURSA W/US: CPT | Mod: 50

## 2023-11-09 PROCEDURE — 73564 X-RAY EXAM KNEE 4 OR MORE: CPT | Mod: 50

## 2023-11-16 ENCOUNTER — APPOINTMENT (OUTPATIENT)
Dept: ORTHOPEDIC SURGERY | Facility: CLINIC | Age: 88
End: 2023-11-16
Payer: MEDICARE

## 2023-11-16 VITALS — WEIGHT: 205 LBS | BODY MASS INDEX: 27.77 KG/M2 | HEIGHT: 72 IN

## 2023-11-16 PROCEDURE — 20611 DRAIN/INJ JOINT/BURSA W/US: CPT | Mod: 50

## 2023-11-16 PROCEDURE — 99213 OFFICE O/P EST LOW 20 MIN: CPT | Mod: 25

## 2023-11-29 ENCOUNTER — APPOINTMENT (OUTPATIENT)
Dept: NEUROLOGY | Facility: CLINIC | Age: 88
End: 2023-11-29
Payer: MEDICARE

## 2023-11-29 VITALS
SYSTOLIC BLOOD PRESSURE: 154 MMHG | HEART RATE: 72 BPM | DIASTOLIC BLOOD PRESSURE: 81 MMHG | WEIGHT: 202 LBS | HEIGHT: 72 IN | BODY MASS INDEX: 27.36 KG/M2

## 2023-11-29 PROCEDURE — 99213 OFFICE O/P EST LOW 20 MIN: CPT

## 2023-11-30 ENCOUNTER — APPOINTMENT (OUTPATIENT)
Dept: ORTHOPEDIC SURGERY | Facility: CLINIC | Age: 88
End: 2023-11-30
Payer: MEDICARE

## 2023-11-30 VITALS — BODY MASS INDEX: 27.36 KG/M2 | HEIGHT: 72 IN | WEIGHT: 202 LBS

## 2023-11-30 DIAGNOSIS — M17.12 UNILATERAL PRIMARY OSTEOARTHRITIS, LEFT KNEE: ICD-10-CM

## 2023-11-30 DIAGNOSIS — M17.11 UNILATERAL PRIMARY OSTEOARTHRITIS, RIGHT KNEE: ICD-10-CM

## 2023-11-30 PROCEDURE — 20611 DRAIN/INJ JOINT/BURSA W/US: CPT | Mod: 50

## 2023-11-30 PROCEDURE — 99213 OFFICE O/P EST LOW 20 MIN: CPT | Mod: 25

## 2023-12-03 PROBLEM — M17.12 PRIMARY OSTEOARTHRITIS OF LEFT KNEE: Status: ACTIVE | Noted: 2022-12-14

## 2023-12-03 PROBLEM — M17.11 PRIMARY OSTEOARTHRITIS OF RIGHT KNEE: Status: ACTIVE | Noted: 2022-12-14

## 2023-12-07 ENCOUNTER — APPOINTMENT (OUTPATIENT)
Dept: ELECTROPHYSIOLOGY | Facility: CLINIC | Age: 88
End: 2023-12-07
Payer: MEDICARE

## 2023-12-07 VITALS
SYSTOLIC BLOOD PRESSURE: 140 MMHG | DIASTOLIC BLOOD PRESSURE: 80 MMHG | HEIGHT: 72 IN | HEART RATE: 68 BPM | OXYGEN SATURATION: 98 %

## 2023-12-07 PROCEDURE — 99214 OFFICE O/P EST MOD 30 MIN: CPT

## 2023-12-07 PROCEDURE — 93280 PM DEVICE PROGR EVAL DUAL: CPT

## 2023-12-07 PROCEDURE — 93000 ELECTROCARDIOGRAM COMPLETE: CPT | Mod: 59

## 2023-12-26 NOTE — HISTORY OF PRESENT ILLNESS
[FreeTextEntry1] : Mr. Vallejo is an 88 year old male patient with  afib/flutter and CAD Who presented to the ED complaining of profound fatigue in the setting of bradycardia in June 2022. He underwent pacemaker implant.  He remained in sinus until Sept 2023. He now has symptoms of fatigue and dyspnea on exertion.

## 2023-12-26 NOTE — ASSESSMENT
[FreeTextEntry1] :    Amiodarone:  Will need yearly evaluation of thyroid function, Liver function, pulmonary function and eye exam. Sooner if symptoms develop.  Instructed the patient to immediately report any signs of toxicity including but not limited to breathlessness, non-productive cough, rapid weight loss/gain, fatigue, change in vision, darkening of urine, or jaundice. Advised him to protect her skin with sunscreen.    During this visit, MAURO ZINA  and VERÓNICA had a comprehensive discussion of arrhythmia management using principles of shared decision-making. This included a discussion of anti-arrhythmic medications including class I agents (e.g. flecainide), class III agents (e.g. amiodarone, dofetilide, sotalol, etc.), and both class II and IV agents (beta-blockers and calcium channel blockers). We reviewed the potentially life-threatening side effects of these medications, including (but not limited to) fatal tachyarrhythmias, pulmonary toxicity, liver toxicity, thyroid disorders. We also reviewed the requisite monitoring required of some of these medications. In addition, we reviewed ablative therapy, including the potential benefits and risks of catheter ablation. These risks include death, myocardial infarction, stroke, cardiac perforation, vascular injury, and other less severe complications. Mr. IZAGUIRRE  demonstrated a clear understanding of these issues during our discussion.

## 2024-02-23 DIAGNOSIS — I48.92 UNSPECIFIED ATRIAL FLUTTER: ICD-10-CM

## 2024-02-24 DIAGNOSIS — I48.92 UNSPECIFIED ATRIAL FLUTTER: ICD-10-CM

## 2024-04-15 ENCOUNTER — APPOINTMENT (OUTPATIENT)
Dept: NEUROLOGY | Facility: CLINIC | Age: 89
End: 2024-04-15
Payer: MEDICARE

## 2024-04-15 VITALS
HEIGHT: 72 IN | SYSTOLIC BLOOD PRESSURE: 148 MMHG | TEMPERATURE: 98.2 F | WEIGHT: 195 LBS | DIASTOLIC BLOOD PRESSURE: 77 MMHG | HEART RATE: 60 BPM | BODY MASS INDEX: 26.41 KG/M2

## 2024-04-15 DIAGNOSIS — G31.84 MILD COGNITIVE IMPAIRMENT, SO STATED: ICD-10-CM

## 2024-04-15 PROCEDURE — 99213 OFFICE O/P EST LOW 20 MIN: CPT

## 2024-04-15 PROCEDURE — G2211 COMPLEX E/M VISIT ADD ON: CPT

## 2024-04-15 NOTE — DATA REVIEWED
[de-identified] : MRI brain and pituitary with and without contrast 6/29/21: 4 mm pituitary microadenoma. Mild chronic microvascular ischemic changes. [de-identified] : labs 6/9/23: vitamin 12 972, folate > 22.3, TSH 2.14  CT head 7/14/23: No evidence  of acute intracranial hemorrhage, midline shift or mass effect. Mild parenchymal volume loss. Mild nonspecific periventricular hypodensity may be related to small vessel disease.   HST 9/8/23: AHI 19. Supine AHI 27.5, non-supine AHI 1.3

## 2024-04-15 NOTE — HISTORY OF PRESENT ILLNESS
[FreeTextEntry1] : 9/8/23: Mr. Vallejo is here today for neurology evaluation. He is here today with his wife. He reports that for a long time he feels foggy. He feels lethargic/sleepy. He sleeps through the night other than some disruptions to use the bathroom. He says that he is sleeping for 6-7 hours per night. He does not feel well rested when he wakes up in the morning.  HIs wife will go out and do errands and he will take a nap. he does fall asleep in the car when his wife drives. His wife says that snoring is minimal. His wife does not note pauses in his breathing during sleep.  He often takes zolpidem (5 mg) or alprazolam (0.25 mg) to help him fall asleep.  Sometimes he has difficulty finding words. [facility] A MoCA was done. His score was 24/30.  He also reports tremors. He was previously diagnosed with essential tremors. He says that he has had tremors for "a very long time". He is not aware of any family history of tremors. He reports worsening over time.  He takes low dose propranolol (10 mg bid) for high blood pressure. His wife says that his tremors improve when he has a drink.  He has not noticed any change with caffeine.  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  His Jupiter Sleepiness Scale Score is 9/24.  11/29/23: He reports difficulty using CPAP. He says that it makes it harder to sleep and he cannot use it. He continues to feel that he is foggy. He has interrupted sleep but says that overall he gets 7 hours of sleep. He denies any substantial change in his memory. He reports worsening of his tremors. He finds it difficult to write. He has difficulty eating soup or holding a glass with one hand.  He is taking propranolol 10 mg BID.  4/15/24: He reports worsening of tremors.  He has not increased the dose of propranolol yet. He sees Dr. Kam on 4/18. He feels that memory is also problematic, particularly short term memory. He is independent in his ADLs. He denies getting lost when driving. He remembers to take his medications.  He tried Belsomra once or twice. It helped him to fall asleep but when he got up to use the bathroom he felt dizzy.  He continues to use alprazolam before sleep.  He did not notice any improvement in nocturia when he tried CPAP.

## 2024-04-15 NOTE — PHYSICAL EXAM
[FreeTextEntry1] :     Examination: Constitutional: normal, no apparent distress Eyes: normal conjunctiva b/l, no ptosis, visual fields full Respiratory: no respiratory distress, normal effort, normal auscultation Cardiovascular: normal rate, rhythm, no murmurs Neck: supple, no masses Vascular: carotids normal Skin: normal color, no rashes Psych: normal mood, affect  Neurological: Memory: oriented to person, place, time. Recalled 1/3 words after 3 minutes. Language intact/no aphasia Cranial Nerves: II-XII intact, Pupils equally round and reactive to light, ocular muscles/movements intact, no ptosis, no facial weakness, tongue protrudes normally in the midline, Motor: normal tone, no pronator drift, full strength in all four extremities in the proximal and distal muscle groups No bradykinesia or rigidity Coordination: High frequency tremor in both hands with extension, b/l intention tremor Sensory: intact to light touch DTRs: symmetric, trace in b/l triceps, trace in b/l biceps, trace in b/l brachioradialis, absent in bilateral patellars, Gait: narrow based, steady.  [Over the Past 2 Weeks, Have You Felt Down, Depressed, or Hopeless?] : 1.) Over the past 2 weeks, have you felt down, depressed, or hopeless? No [Over the Past 2 Weeks, Have You Felt Little Interest or Pleasure Doing Things?] : 2.) Over the past 2 weeks, have you felt little interest or pleasure doing things? No

## 2024-04-15 NOTE — DISCUSSION/SUMMARY
[FreeTextEntry1] : Mr. Vallejo is an 88 year old man with complaints of mental fogginess, daytime sleepiness, insomnia and tremor.  Mild Cognitive Impairment -[facility], consistent with mild cognitive impairment. -There has not been any suggestion of ventriculomegaly on prior imaging. -Vitamin B12 and TSH levels were normal -Mild cognitive impairment with short term memory loss. If symptoms worsen can add donepezil. - We discussed the importance of staying mentally and physically active.  Obstructive sleep apnea: -He has chronic daytime sleepiness -Home sleep study showed moderate ANGELA with an AHI of19. Supine AHI 27.5, non-supine AHI 1.3 -He prefers to sleep on his back (without pillows). -He did not tolerate CPAP. Can try a mandibular advancement device or Inspire.  -[facility] If daytime sleepiness persists we can add modafanil/armodafanil.   Insomnia -Continue to follow sleep hygiene -Previous medication trials include Zolpidem and Xanax -He had side effects with Belsomra and feels that Xanax works best.    Tremors -Exam is consistent with benign essential tremor -He is taking very low dose propranolol. I suggested increasing propranolol to at least 20 mg BID. If not effective would then increase to 40 mg BID.  He has had bradycardia in the past but now has a pacemaker. He would like to discuss with Dr. Kam prior to making the change.    f/u 2-3 months

## 2024-04-26 ENCOUNTER — APPOINTMENT (OUTPATIENT)
Dept: ELECTROPHYSIOLOGY | Facility: CLINIC | Age: 89
End: 2024-04-26
Payer: MEDICARE

## 2024-04-26 VITALS
OXYGEN SATURATION: 99 % | HEART RATE: 60 BPM | WEIGHT: 201 LBS | RESPIRATION RATE: 16 BRPM | SYSTOLIC BLOOD PRESSURE: 152 MMHG | DIASTOLIC BLOOD PRESSURE: 82 MMHG | BODY MASS INDEX: 27.22 KG/M2 | HEIGHT: 72 IN

## 2024-04-26 DIAGNOSIS — Z95.0 PRESENCE OF CARDIAC PACEMAKER: ICD-10-CM

## 2024-04-26 DIAGNOSIS — I44.30 UNSPECIFIED ATRIOVENTRICULAR BLOCK: ICD-10-CM

## 2024-04-26 DIAGNOSIS — I48.92 UNSPECIFIED ATRIAL FLUTTER: ICD-10-CM

## 2024-04-26 DIAGNOSIS — I10 ESSENTIAL (PRIMARY) HYPERTENSION: ICD-10-CM

## 2024-04-26 PROCEDURE — G2211 COMPLEX E/M VISIT ADD ON: CPT

## 2024-04-26 PROCEDURE — 99214 OFFICE O/P EST MOD 30 MIN: CPT

## 2024-04-26 PROCEDURE — 93280 PM DEVICE PROGR EVAL DUAL: CPT

## 2024-04-26 PROCEDURE — 93000 ELECTROCARDIOGRAM COMPLETE: CPT | Mod: 59

## 2024-04-26 RX ORDER — ALPRAZOLAM 0.25 MG/1
0.25 TABLET ORAL
Refills: 0 | Status: ACTIVE | COMMUNITY

## 2024-04-26 RX ORDER — AMIODARONE HYDROCHLORIDE 100 MG/1
100 TABLET ORAL
Qty: 90 | Refills: 3 | Status: ACTIVE | COMMUNITY
Start: 2023-10-02 | End: 1900-01-01

## 2024-04-26 RX ORDER — TADALAFIL 5 MG/1
5 TABLET ORAL DAILY
Refills: 0 | Status: ACTIVE | COMMUNITY

## 2024-05-06 NOTE — ED ADULT NURSE NOTE - OBJECTIVE STATEMENT
Pt presents to ED for bradycardia. Pt has pacemaker placed one year ago for bradycardia. Pt endorses noticing his HR dropping to 30bpm sometimes. MD Haines told pt to come to ED if this reoccurs. Pt has no physical complaints at this time. Pt is well appearing, alert and orientated at this time. Placed on cardiac monitor. ancef

## 2024-06-04 ENCOUNTER — NON-APPOINTMENT (OUTPATIENT)
Age: 89
End: 2024-06-04

## 2024-06-04 ENCOUNTER — APPOINTMENT (OUTPATIENT)
Dept: ELECTROPHYSIOLOGY | Facility: CLINIC | Age: 89
End: 2024-06-04
Payer: MEDICARE

## 2024-06-04 VITALS
RESPIRATION RATE: 16 BRPM | HEART RATE: 61 BPM | DIASTOLIC BLOOD PRESSURE: 63 MMHG | WEIGHT: 201 LBS | HEIGHT: 72 IN | BODY MASS INDEX: 27.22 KG/M2 | SYSTOLIC BLOOD PRESSURE: 131 MMHG | OXYGEN SATURATION: 98 %

## 2024-06-04 PROCEDURE — 93280 PM DEVICE PROGR EVAL DUAL: CPT

## 2024-06-18 ENCOUNTER — APPOINTMENT (OUTPATIENT)
Dept: NEUROLOGY | Facility: CLINIC | Age: 89
End: 2024-06-18
Payer: MEDICARE

## 2024-06-18 VITALS
SYSTOLIC BLOOD PRESSURE: 153 MMHG | DIASTOLIC BLOOD PRESSURE: 81 MMHG | HEIGHT: 72 IN | HEART RATE: 60 BPM | WEIGHT: 200 LBS | BODY MASS INDEX: 27.09 KG/M2 | TEMPERATURE: 97.8 F

## 2024-06-18 DIAGNOSIS — G47.09 OTHER INSOMNIA: ICD-10-CM

## 2024-06-18 DIAGNOSIS — G47.19 OTHER HYPERSOMNIA: ICD-10-CM

## 2024-06-18 DIAGNOSIS — G25.0 ESSENTIAL TREMOR: ICD-10-CM

## 2024-06-18 DIAGNOSIS — G47.33 OBSTRUCTIVE SLEEP APNEA (ADULT) (PEDIATRIC): ICD-10-CM

## 2024-06-18 PROCEDURE — G2211 COMPLEX E/M VISIT ADD ON: CPT

## 2024-06-18 PROCEDURE — 99214 OFFICE O/P EST MOD 30 MIN: CPT

## 2024-06-18 RX ORDER — AMLODIPINE BESYLATE 5 MG/1
5 TABLET ORAL DAILY
Refills: 0 | Status: ACTIVE | COMMUNITY

## 2024-06-18 RX ORDER — LEVOTHYROXINE SODIUM 0.1 MG/1
100 TABLET ORAL DAILY
Refills: 0 | Status: ACTIVE | COMMUNITY

## 2024-06-18 RX ORDER — ALLOPURINOL 200 MG/1
200 TABLET ORAL DAILY
Refills: 0 | Status: ACTIVE | COMMUNITY

## 2024-06-18 RX ORDER — ZOLPIDEM TARTRATE 10 MG/1
10 TABLET ORAL
Refills: 0 | Status: ACTIVE | COMMUNITY

## 2024-06-18 RX ORDER — SUVOREXANT 10 MG/1
10 TABLET, FILM COATED ORAL
Qty: 30 | Refills: 3 | Status: DISCONTINUED | COMMUNITY
Start: 2023-11-29 | End: 2024-06-18

## 2024-06-18 RX ORDER — LEVOTHYROXINE SODIUM 88 UG/1
88 TABLET ORAL DAILY
Refills: 0 | Status: DISCONTINUED | COMMUNITY
End: 2024-06-18

## 2024-06-18 NOTE — DISCUSSION/SUMMARY
[FreeTextEntry1] : Mr. Vallejo is an 88 year old man with complaints of mental fogginess, daytime sleepiness, insomnia and tremor.   Obstructive sleep apnea/Daytime Sleepiness/Insomnia -He has chronic daytime sleepiness -Home sleep study showed moderate ANGELA with an AHI of19. Supine AHI 27.5, non-supine AHI 1.3 -He did not tolerate CPAP and has an appointment this week with [facility].  -If sleepiness persists, we can add Modafanil.   -Continue to follow sleep hygiene -Previous medication trials include Zolpidem, Xanax, Belsomra. -Xanax has worked best. Can use 0.25 mg prn. I do not recommend using  Zolpidem.   Tremors -Exam is consistent with benign essential tremor -He is taking very low dose propranolol 10 mg BID. -I suggest a trial of increasing to 20 mg BID and if needed to 40 mg BID. He would like to discuss this with Dr. Haines first.  He has a pacemaker.   Mild Cognitive Impairment -He [facility] 24[facility], consistent with mild cognitive impairment. -There has not been any suggestion of ventriculomegaly on prior imaging. -Vitamin B12 and TSH levels were normal -Mild cognitive impairment with short term memory loss. If symptoms worsen can add donepezil. - We discussed the importance of staying mentally and physically active.  f/u 6 months

## 2024-06-18 NOTE — HISTORY OF PRESENT ILLNESS
[FreeTextEntry1] : 9/8/23: Mr. Vallejo is here today for neurology evaluation. He is here today with his wife. He reports that for a long time he feels foggy. He feels lethargic/sleepy. He sleeps through the night other than some disruptions to use the bathroom. He says that he is sleeping for 6-7 hours per night. He does not feel well rested when he wakes up in the morning.  HIs wife will go out and do errands and he will take a nap. he does fall asleep in the car when his wife drives. His wife says that snoring is minimal. His wife does not note pauses in his breathing during sleep.  He often takes zolpidem (5 mg) or alprazolam (0.25 mg) to help him fall asleep.  Sometimes he has difficulty finding words. [facility]. A MoCA was done. His score was 24/30.  He also reports tremors. He was previously diagnosed with essential tremors. He says that he has had tremors for "a very long time". He is not aware of any family history of tremors. He reports worsening over time.  He takes low dose propranolol (10 mg bid) for high blood pressure. His wife says that his tremors improve when he has a drink.  He has not noticed any change with caffeine.  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  His Carmen Sleepiness Scale Score is 9/24.  11/29/23: He reports difficulty using CPAP. He says that it makes it harder to sleep and he cannot use it. He continues to feel that he is foggy. He has interrupted sleep but says that overall he gets 7 hours of sleep. He denies any substantial change in his memory. He reports worsening of his tremors. He finds it difficult to write. He has difficulty eating soup or holding a glass with one hand.  He is taking propranolol 10 mg BID.  4/15/24: He reports worsening of tremors.  He has not increased the dose of propranolol yet. He sees Dr. Kam on 4/18. He feels that memory is also problematic, particularly short term memory. He is independent in his ADLs. He denies getting lost when driving. He remembers to take his medications.  He tried Belsomra once or twice. It helped him to fall asleep but when he got up to use the bathroom he felt dizzy.  He continues to use alprazolam before sleep.  He did not notice any improvement in nocturia when he tried CPAP.   6/18/24: He is here today with his wife. Sleep continues to be an issue. He can fall asleep easily. He wakes up to urinate and has difficulty falling back to sleep.  He sleeps for ~ 7 hours in total but it is fragmented. He does not feel well rested when he wakes up. He is sleepy throughout the day. He can fall asleep while waiting for his wife in the car. He does not fell well rested after a cat nap. He did not try CPAP at all. He was afraid it would make his insomnia worse. He has an upcoming appointment with [facility].  He felt dizzy the first night he tried Belsomra when he got up to use the bathroom.  He does not think that tadalafil has helped with nocturia.  He occasionally takes alprazolam 0.25 mg which helps him sleep. However, he does not feel any better the next day.   Tremors continue to be a problem. He says they have gotten worse. He has difficulty writing.  He is only taking propranolol 10 mg BID.

## 2024-06-18 NOTE — PHYSICAL EXAM
[FreeTextEntry1] : Examination: Constitutional: normal, no apparent distress Eyes: normal conjunctiva b/l, no ptosis, visual fields full Respiratory: no respiratory distress, normal effort, normal auscultation Cardiovascular: normal rate, rhythm, no murmurs Neck: supple, no masses Vascular: carotids normal Skin: normal color, no rashes Psych: normal mood, affect  Neurological: Memory: oriented to person, place, time. Recalled 1/3 words after 3 minutes. Language intact/no aphasia Cranial Nerves: II-XII intact, Pupils equally round and reactive to light, ocular muscles/movements intact, no ptosis, no facial weakness, tongue protrudes normally in the midline, Motor: normal tone, no pronator drift, full strength in all four extremities in the proximal and distal muscle groups No bradykinesia or rigidity Coordination: High frequency tremor in both hands with extension, b/l intention tremor Sensory: intact to light touch DTRs: symmetric, trace in b/l triceps, trace in b/l biceps, trace in b/l brachioradialis, absent in bilateral patellars, Gait: narrow based, steady.

## 2024-06-18 NOTE — DATA REVIEWED
[de-identified] : MRI brain and pituitary with and without contrast 6/29/21: 4 mm pituitary microadenoma. Mild chronic microvascular ischemic changes. [de-identified] : labs 6/9/23: vitamin 12 972, folate > 22.3, TSH 2.14  CT head 7/14/23: No evidence  of acute intracranial hemorrhage, midline shift or mass effect. Mild parenchymal volume loss. Mild nonspecific periventricular hypodensity may be related to small vessel disease.   HST 9/8/23: AHI 19. Supine AHI 27.5, non-supine AHI 1.3

## 2024-06-26 PROBLEM — I44.30 AV BLOCK: Status: ACTIVE | Noted: 2022-06-21

## 2024-06-26 PROBLEM — Z95.0 CARDIAC PACEMAKER: Status: ACTIVE | Noted: 2022-06-21

## 2024-06-26 PROBLEM — I10 HTN (HYPERTENSION): Status: ACTIVE | Noted: 2022-06-21

## 2024-06-26 PROBLEM — I48.92 ATRIAL FLUTTER, PAROXYSMAL: Status: ACTIVE | Noted: 2022-06-21

## 2024-08-19 ENCOUNTER — NON-APPOINTMENT (OUTPATIENT)
Age: 89
End: 2024-08-19

## 2024-08-21 ENCOUNTER — EMERGENCY (EMERGENCY)
Facility: HOSPITAL | Age: 89
LOS: 0 days | Discharge: HOME | End: 2024-08-21
Attending: FAMILY MEDICINE
Payer: MEDICARE

## 2024-08-21 VITALS
DIASTOLIC BLOOD PRESSURE: 92 MMHG | TEMPERATURE: 101 F | RESPIRATION RATE: 22 BRPM | SYSTOLIC BLOOD PRESSURE: 188 MMHG | OXYGEN SATURATION: 97 % | HEART RATE: 86 BPM

## 2024-08-21 VITALS — WEIGHT: 202.6 LBS | HEIGHT: 68 IN

## 2024-08-21 DIAGNOSIS — Z98.89 OTHER SPECIFIED POSTPROCEDURAL STATES: Chronic | ICD-10-CM

## 2024-08-21 PROCEDURE — 71046 X-RAY EXAM CHEST 2 VIEWS: CPT | Mod: 26

## 2024-08-21 PROCEDURE — 71046 X-RAY EXAM CHEST 2 VIEWS: CPT

## 2024-08-21 PROCEDURE — 99283 EMERGENCY DEPT VISIT LOW MDM: CPT | Mod: 25

## 2024-08-21 PROCEDURE — 99284 EMERGENCY DEPT VISIT MOD MDM: CPT

## 2024-08-21 RX ORDER — ACETAMINOPHEN 500 MG
1000 TABLET ORAL ONCE
Refills: 0 | Status: COMPLETED | OUTPATIENT
Start: 2024-08-21 | End: 2024-08-21

## 2024-08-21 RX ADMIN — Medication 1000 MILLIGRAM(S): at 22:46

## 2024-08-21 NOTE — ED ADULT NURSE NOTE - CHIEF COMPLAINT QUOTE
patient ambulatory to ED to flu like symptoms.  tested positive for covid 8/20 at urgent care, reports symptoms since 8/19.  endorsing fever, last tylenol at 3 PM. No edema noted per nursing flowsheets

## 2024-08-21 NOTE — ED ADULT NURSE NOTE - OBJECTIVE STATEMENT
89 y/o male awake alert and oriented x4 presents to ED c/o COVID +. Pt had fever, cough, congestion, bodyaches and weakness x 3 days. pt was seen at urgent care and tested positive for covid. pt took tylenol for fever. Last Tylenol taken at 3pm. hx pacemaker, atria flutter on eliquis. Denies chest pain, sob, nausea, vomiting, diarrhea.

## 2024-08-21 NOTE — ED ADULT TRIAGE NOTE - CHIEF COMPLAINT QUOTE
patient ambulatory to ED to flu like symptoms.  tested positive for covid 8/20 at urgent care, reports symptoms since 8/19.  endorsing fever, last tylenol at 3 PM.

## 2024-08-21 NOTE — ED PROVIDER NOTE - OBJECTIVE STATEMENT
87 yo wm with hx afib on eliquis, nghiar, who developed malaise body aches and fever on Monday. pt went to [facility], [place] and was found to be covid positive. Pt was prescribed meds molnupirivir but did not take then because he was concerned about durg interaction. Pt was concerned because of his age an his fever. pt received full immunizations vs covid, eis aeating and drinking well. No sob. 89 yo wm with hx afib on eliquis, pacer,htn, hld, bph who developed malaise body aches and fever on Monday. pt went to [facility] and was found to be covid positive. Pt was prescribed meds molnupirivir but did not take then because he was concerned about durg interaction. Pt was concerned because of his age and his fever that would come back after antipyretic dosing was done.. pt received full immunizations vs covid, is eating and drinking well. No sob.

## 2024-08-21 NOTE — ED PROVIDER NOTE - NSICDXPASTSURGICALHX_GEN_ALL_CORE_FT
Respiratory PAST SURGICAL HISTORY:  History of cataract surgery, right     History of coronary angiogram 1997    S/P tonsillectomy

## 2024-08-21 NOTE — ED PROVIDER NOTE - CLINICAL SUMMARY MEDICAL DECISION MAKING FREE TEXT BOX
89 yo wm with hx afib on eliquis, pacer,htn, hld, bph who developed malaise body aches and fever on Monday. pt went to [facility] and was found to be covid positive. Pt was prescribed meds molnupirivir but did not take then because he was concerned about drug interaction. Pt febrile normotensive not hypoxic not c/o sob. Immunized vs covid. Labs, cxr, ekg. probable discharge.

## 2024-08-21 NOTE — ED PROVIDER NOTE - NSFOLLOWUPINSTRUCTIONS_ED_ALL_ED_FT
Continue tylenol every four hours for fever. Drink lots of fluids with electrolytes. Return to ER if shortness of breath or inability to drink. Follow up with your doctor.   COVID-19 (Coronavirus Disease 2019)    WHAT YOU NEED TO KNOW:    COVID-19 is the disease caused by a coronavirus first discovered in December 2019. The virus has changed into several new forms (called variants) since it was discovered. A variant may be more easily spread or cause more severe illness than the original form.    DISCHARGE INSTRUCTIONS:    Call your local emergency number (911 in the [place]) if:    You have trouble breathing or shortness of breath at rest.    You have chest pain or pressure that lasts longer than 5 minutes.    You become confused or hard to wake.  Seek care immediately if:    You have trouble staying awake during the day.    Your nail beds, face, fingers, or toes look blue or darker than usual.  Call your doctor if:    You have a fever.    You have questions or concerns about your condition or care.  Medicines: You may need any of the following:    Decongestants help reduce nasal congestion and help you breathe more easily. If you take decongestant pills, they may make you feel restless or cause problems with your sleep. Do not use decongestant sprays for more than a few days.    Cough suppressants help reduce coughing. Ask your healthcare provider which type of cough medicine is best for you.    Acetaminophen decreases pain and fever. It is available without a doctor's order. Ask how much to take and how often to take it. Follow directions. Read the labels of all other medicines you are using to see if they also contain acetaminophen, or ask your doctor or pharmacist. Acetaminophen can cause liver damage if not taken correctly.    NSAIDs, such as ibuprofen, help decrease swelling, pain, and fever. This medicine is available with or without a doctor's order. NSAIDs can cause stomach bleeding or kidney problems in certain people. If you take blood thinner medicine, always ask your healthcare provider if NSAIDs are safe for you. Always read the medicine label and follow directions.    Antiviral medicines may be given if you are at risk for developing severe or life-threatening symptoms.    Blood thinners help prevent blood clots. Clots can cause strokes, heart attacks, and death. Many types of blood thinners are available. Your healthcare provider will give you specific instructions for the type you are given. The following are general safety guidelines to follow while you are taking a blood thinner:  Watch for bleeding and bruising. Watch for bleeding from your gums or nose. Watch for blood in your urine and bowel movements. Use a soft washcloth on your skin, and a soft toothbrush to brush your teeth. This can keep your skin and gums from bleeding. If you shave, use an electric shaver. Do not play contact sports.    Tell your dentist and other healthcare providers that you take a blood thinner. Wear a bracelet or necklace that says you take this medicine.    Do not start or stop any other medicines or supplements unless your healthcare provider tells you to. Many medicines and supplements cannot be used with blood thinners.    Take your blood thinner exactly as prescribed by your healthcare provider. Do not skip a dose or take less than prescribed. Tell your provider right away if you forget to take your blood thinner, or if you take too much.    Take your medicine as directed. Contact your healthcare provider if you think your medicine is not helping or if you have side effects. Tell your provider if you are allergic to any medicine. Keep a list of the medicines, vitamins, and herbs you take. Include the amounts, and when and why you take them. Bring the list or the pill bottles to follow-up visits. Carry your medicine list with you in case of an emergency.  What you need to know about COVID-19 vaccines: Healthcare providers recommend vaccination, even if you already had COVID-19.    Get a COVID-19 vaccine as directed. At least 1 dose of an updated vaccine is recommended for everyone 6 months or older. COVID-19 vaccines are given as a shot in 1 to 3 doses, depending on the age of the person who receives it. COVID-19 vaccines are updated throughout the year. Your healthcare provider can help you schedule all needed doses as updated vaccines become available.  Recommended COVID-19 Immunization Schedule      Continue to protect yourself and others. You can become infected even after you get the vaccine. You may also be able to pass the virus to others without knowing you are infected.    After you get the vaccine, check local, national, and international travel rules. You may need to be tested before you travel. Some countries require proof of a negative test before you travel. You may also need to quarantine after you return.    Medicine may be given to prevent infection. The medicine can be given if you are at high risk for infection and cannot get the vaccine. It can also be given if your immune system does not respond well to the vaccine.  How the virus spreads: The virus can be spread starting 2 to 3 days before symptoms begin. Close personal contact with an infected person increases your risk for infection. This means being within 6 feet (2 meters) of the person for at least 15 minutes over 24 hours.    The virus travels in droplets that form when a person talks, sings, coughs, or sneezes. The droplets can also float in the air for minutes or hours. Infection happens when you breathe in the droplets or get them in your eyes or nose.    Person-to-person contact can spread the virus. For example, a person with the virus on his or her hands can spread it by shaking hands with someone.    The virus can stay on objects and surfaces for hours to days. You may become infected by touching the object or surface and then touching your eyes or mouth.  Help lower your risk for COVID-19 during an active outbreak:    Stay home if you are sick or think you may have COVID-19. It is important to stay home if you are waiting for a testing appointment or for test results.    Wash your hands often throughout the day. Use soap and water. Rub your soapy hands together, lacing your fingers, for at least 20 seconds. Rinse with warm, running water. Dry your hands with a clean towel or paper towel. Use hand  that contains alcohol if soap and water are not available. Teach children how to wash their hands and use hand .  Handwashing      Cover sneezes and coughs. Turn your face away and cover your mouth and nose with a tissue. Throw the tissue away. Use the bend of your arm if a tissue is not available. Then wash your hands well with soap and water or use hand . Teach children how to cover a cough or sneeze.    Wear a face covering (mask) when needed. Use a cloth covering with at least 2 layers. You can also create layers by putting a cloth covering over a disposable non-medical mask. Cover your mouth and your nose.  How to Wear a Face Covering (Mask)      Try to keep space between you and others when you are out of the house. Avoid crowds as much as possible. Wear a face covering when you must be around a large group and cannot keep space between you and others.    Clean and disinfect high-touch surfaces and objects often. Use disinfecting wipes, or make a solution of 4 teaspoons of bleach in 1 quart (4 cups) of water.    Ask about other vaccines you may need. Get the influenza (flu) vaccine as soon as recommended each year, usually starting in September or October. Get the pneumonia and respiratory syncytial virus (RSV) vaccines, if recommended. Your healthcare provider can tell you if you should also get other vaccines, and when to get them.  Prevent COVID-19 Infection  Follow up with your doctor as directed: Write down your questions so you remember to ask them during your visits.    For more information:    Centers for Disease Control and Prevention  [address] [facility] [address]  Phone: 1-615.294.1049  Web Address: http://www.cdc.gov

## 2024-08-21 NOTE — ED PROVIDER NOTE - NSICDXPASTMEDICALHX_GEN_ALL_CORE_FT
PAST MEDICAL HISTORY:  Benign prostatic hypertrophy     Chronic atrial fibrillation     Essential tremor     Hemorrhoids     Hypercholesterolemia     Hypertension     Hypothyroid

## 2024-08-21 NOTE — ED PROVIDER NOTE - PATIENT PORTAL LINK FT
You can access the FollowMyHealth Patient Portal offered by [facility] by registering at the following website: [email or web address]. By joining [facility]’s FollowMyHealth portal, you will also be able to view your health information using other applications (apps) compatible with our system.

## 2024-09-04 ENCOUNTER — APPOINTMENT (OUTPATIENT)
Dept: ELECTROPHYSIOLOGY | Facility: CLINIC | Age: 89
End: 2024-09-04
Payer: MEDICARE

## 2024-09-05 ENCOUNTER — NON-APPOINTMENT (OUTPATIENT)
Age: 89
End: 2024-09-05

## 2024-09-05 PROCEDURE — 93296 REM INTERROG EVL PM/IDS: CPT

## 2024-09-05 PROCEDURE — 93294 REM INTERROG EVL PM/LDLS PM: CPT

## 2024-09-09 ENCOUNTER — APPOINTMENT (OUTPATIENT)
Dept: INTERNAL MEDICINE | Facility: CLINIC | Age: 89
End: 2024-09-09
Payer: MEDICARE

## 2024-09-09 VITALS
HEIGHT: 70 IN | HEART RATE: 64 BPM | RESPIRATION RATE: 16 BRPM | BODY MASS INDEX: 28.63 KG/M2 | SYSTOLIC BLOOD PRESSURE: 157 MMHG | WEIGHT: 200 LBS | TEMPERATURE: 98.2 F | OXYGEN SATURATION: 96 % | DIASTOLIC BLOOD PRESSURE: 83 MMHG

## 2024-09-09 DIAGNOSIS — F17.290 NICOTINE DEPENDENCE, OTHER TOBACCO PRODUCT, UNCOMPLICATED: ICD-10-CM

## 2024-09-09 DIAGNOSIS — G47.33 OBSTRUCTIVE SLEEP APNEA (ADULT) (PEDIATRIC): ICD-10-CM

## 2024-09-09 DIAGNOSIS — I48.92 UNSPECIFIED ATRIAL FLUTTER: ICD-10-CM

## 2024-09-09 DIAGNOSIS — Z79.899 OTHER LONG TERM (CURRENT) DRUG THERAPY: ICD-10-CM

## 2024-09-09 PROCEDURE — 94727 GAS DIL/WSHOT DETER LNG VOL: CPT

## 2024-09-09 PROCEDURE — 99213 OFFICE O/P EST LOW 20 MIN: CPT | Mod: 25

## 2024-09-09 PROCEDURE — 94729 DIFFUSING CAPACITY: CPT

## 2024-09-09 PROCEDURE — G2211 COMPLEX E/M VISIT ADD ON: CPT

## 2024-09-09 PROCEDURE — ZZZZZ: CPT

## 2024-09-09 PROCEDURE — 94060 EVALUATION OF WHEEZING: CPT

## 2024-09-09 PROCEDURE — 99213 OFFICE O/P EST LOW 20 MIN: CPT

## 2024-09-09 RX ORDER — PROPRANOLOL HYDROCHLORIDE 160 MG/1
CAPSULE, EXTENDED RELEASE ORAL
Refills: 0 | Status: ACTIVE | COMMUNITY

## 2024-09-09 NOTE — HISTORY OF PRESENT ILLNESS
[TextBox_4] : Mr. Vallejo presents for initial pulmonary evaluation accompanied by his wife.  He has a history of paroxysmal atrial flutter and AV block.  He has been started on amiodarone therapy and presents for baseline pulmonary function test.  Mr. Vallejo gets occasional episodes of shortness of breath with exertion, however, symptoms resolved with rest.  He has no chest pain or palpitations.  Patient denies any nocturnal symptoms of cough or dyspnea.  He also has a history of obstructive sleep apnea.  He had a home polysomnography examination on 9/9/2023 by Dr. Carias which showed moderate obstructive sleep apnea.  The AHI was 19.0 events per hour and the MARY ANN was 27.5 events per hour.  Mr. Vallejo has been fitted for an oral dental appliance which she has not yet started using.

## 2024-09-09 NOTE — DISCUSSION/SUMMARY
[FreeTextEntry1] : Mr. Vallejo presents for initial pulmonary evaluation.  He has a history of paroxysmal atrial flutter and is currently on amiodarone therapy.  He presents for baseline pulmonary function tests to monitor for amiodarone toxicity.  Complete pulmonary function tests are within normal limits.  Patient also has a history of obstructive sleep apnea and will use an oral dental appliance.  Follow-up in this office in 6 months with repeat complete pulmonary function test to monitor for amiodarone toxicity.

## 2024-10-29 ENCOUNTER — APPOINTMENT (OUTPATIENT)
Dept: ELECTROPHYSIOLOGY | Facility: CLINIC | Age: 89
End: 2024-10-29
Payer: MEDICARE

## 2024-10-29 VITALS
DIASTOLIC BLOOD PRESSURE: 80 MMHG | BODY MASS INDEX: 28.63 KG/M2 | HEART RATE: 60 BPM | SYSTOLIC BLOOD PRESSURE: 143 MMHG | WEIGHT: 200 LBS | RESPIRATION RATE: 16 BRPM | HEIGHT: 70 IN | OXYGEN SATURATION: 99 %

## 2024-10-29 DIAGNOSIS — I44.30 UNSPECIFIED ATRIOVENTRICULAR BLOCK: ICD-10-CM

## 2024-10-29 DIAGNOSIS — Z95.0 PRESENCE OF CARDIAC PACEMAKER: ICD-10-CM

## 2024-10-29 PROCEDURE — 99214 OFFICE O/P EST MOD 30 MIN: CPT

## 2024-10-29 PROCEDURE — 93280 PM DEVICE PROGR EVAL DUAL: CPT

## 2024-10-29 PROCEDURE — 93000 ELECTROCARDIOGRAM COMPLETE: CPT | Mod: 59

## 2024-10-29 PROCEDURE — G2211 COMPLEX E/M VISIT ADD ON: CPT

## 2024-10-29 RX ORDER — PROPRANOLOL HYDROCHLORIDE 20 MG/1
20 TABLET ORAL
Qty: 180 | Refills: 3 | Status: ACTIVE | COMMUNITY
Start: 1900-01-01 | End: 1900-01-01

## 2025-01-28 ENCOUNTER — APPOINTMENT (OUTPATIENT)
Dept: ELECTROPHYSIOLOGY | Facility: CLINIC | Age: 89
End: 2025-01-28
Payer: MEDICARE

## 2025-01-28 ENCOUNTER — NON-APPOINTMENT (OUTPATIENT)
Age: 89
End: 2025-01-28

## 2025-01-28 PROCEDURE — 93294 REM INTERROG EVL PM/LDLS PM: CPT

## 2025-01-28 PROCEDURE — 93296 REM INTERROG EVL PM/IDS: CPT

## 2025-03-19 NOTE — ED ADULT NURSE NOTE - SUICIDE SCREENING QUESTION 3
Consider holding glipizide while being treated with antibiotics.  Seek medical care if symptoms worsen or any concerns.  Follow up with your primary care provider within 2-3 days for re-evaluation.   Seek medical care if symptoms worsen or any concerns.      No

## 2025-04-08 ENCOUNTER — APPOINTMENT (OUTPATIENT)
Dept: NEUROLOGY | Facility: CLINIC | Age: 89
End: 2025-04-08
Payer: MEDICARE

## 2025-04-08 VITALS
BODY MASS INDEX: 28.2 KG/M2 | TEMPERATURE: 98.2 F | HEIGHT: 70 IN | HEART RATE: 60 BPM | SYSTOLIC BLOOD PRESSURE: 150 MMHG | WEIGHT: 197 LBS | DIASTOLIC BLOOD PRESSURE: 81 MMHG

## 2025-04-08 DIAGNOSIS — G25.0 ESSENTIAL TREMOR: ICD-10-CM

## 2025-04-08 DIAGNOSIS — G31.84 MILD COGNITIVE IMPAIRMENT, SO STATED: ICD-10-CM

## 2025-04-08 DIAGNOSIS — G47.33 OBSTRUCTIVE SLEEP APNEA (ADULT) (PEDIATRIC): ICD-10-CM

## 2025-04-08 PROCEDURE — 99213 OFFICE O/P EST LOW 20 MIN: CPT

## 2025-04-08 PROCEDURE — G2211 COMPLEX E/M VISIT ADD ON: CPT

## 2025-05-12 ENCOUNTER — APPOINTMENT (OUTPATIENT)
Dept: INTERNAL MEDICINE | Facility: CLINIC | Age: 89
End: 2025-05-12

## 2025-06-03 ENCOUNTER — APPOINTMENT (OUTPATIENT)
Dept: ELECTROPHYSIOLOGY | Facility: CLINIC | Age: 89
End: 2025-06-03

## 2025-06-13 NOTE — DISCHARGE NOTE PROVIDER - ATTENDING DISCHARGE PHYSICAL EXAMINATION:
Last mammogram 6/26/24    Order placed   General: male in no acute distress  Eyes: PERRLA, EOMI; conjunctiva and sclera clear  Head: Normocephalic; atraumatic  ENMT: No nasal discharge; airway clear  Neck: Supple; non tender; no masses  Respiratory: No wheezes, rales or rhonchi  Cardiovascular: S1, S2 paced, Left upper chest PPM in compression dressing  Gastrointestinal: Soft non-tender non-distended; Normal bowel sounds  Genitourinary: No costovertebral angle tenderness  Extremities: No clubbing, cyanosis or edema  Vascular: Peripheral pulses palpable 2+ bilaterally  Neurological: Alert and oriented x4  Skin: Warm and dry  Musculoskeletal: Normal tone  Psychiatric: Cooperative and appropriate

## 2025-06-19 ENCOUNTER — APPOINTMENT (OUTPATIENT)
Dept: GASTROENTEROLOGY | Facility: CLINIC | Age: 89
End: 2025-06-19
Payer: MEDICARE

## 2025-06-19 VITALS
BODY MASS INDEX: 26.41 KG/M2 | DIASTOLIC BLOOD PRESSURE: 86 MMHG | WEIGHT: 195 LBS | SYSTOLIC BLOOD PRESSURE: 124 MMHG | HEIGHT: 72 IN

## 2025-06-19 PROBLEM — R10.9 ABDOMINAL PAIN: Status: ACTIVE | Noted: 2025-06-19

## 2025-06-19 PROBLEM — K21.9 GERD (GASTROESOPHAGEAL REFLUX DISEASE): Status: ACTIVE | Noted: 2025-06-19

## 2025-06-19 PROBLEM — R19.4 ALTERED BOWEL HABITS: Status: ACTIVE | Noted: 2025-06-19

## 2025-06-19 PROCEDURE — 99204 OFFICE O/P NEW MOD 45 MIN: CPT

## 2025-06-19 RX ORDER — OMEPRAZOLE 40 MG/1
40 CAPSULE, DELAYED RELEASE ORAL
Qty: 90 | Refills: 0 | Status: ACTIVE | COMMUNITY
Start: 2025-06-19 | End: 1900-01-01

## 2025-06-19 RX ORDER — OMEPRAZOLE 40 MG/1
40 CAPSULE, DELAYED RELEASE ORAL
Qty: 90 | Refills: 0 | Status: DISCONTINUED | COMMUNITY
Start: 2025-06-19 | End: 2025-06-19

## 2025-06-27 ENCOUNTER — NON-APPOINTMENT (OUTPATIENT)
Age: 89
End: 2025-06-27

## 2025-06-27 ENCOUNTER — APPOINTMENT (OUTPATIENT)
Dept: ELECTROPHYSIOLOGY | Facility: CLINIC | Age: 89
End: 2025-06-27
Payer: MEDICARE

## 2025-06-27 VITALS
BODY MASS INDEX: 27.09 KG/M2 | WEIGHT: 200 LBS | DIASTOLIC BLOOD PRESSURE: 59 MMHG | HEIGHT: 72 IN | OXYGEN SATURATION: 100 % | HEART RATE: 57 BPM | SYSTOLIC BLOOD PRESSURE: 132 MMHG

## 2025-06-27 PROCEDURE — 93280 PM DEVICE PROGR EVAL DUAL: CPT

## 2025-06-27 PROCEDURE — 93000 ELECTROCARDIOGRAM COMPLETE: CPT | Mod: 59

## 2025-06-27 RX ORDER — AMLODIPINE BESYLATE 5 MG/1
5 TABLET ORAL DAILY
Refills: 0 | Status: ACTIVE | COMMUNITY

## 2025-07-22 ENCOUNTER — APPOINTMENT (OUTPATIENT)
Dept: GASTROENTEROLOGY | Facility: CLINIC | Age: 89
End: 2025-07-22

## 2025-08-15 ENCOUNTER — APPOINTMENT (OUTPATIENT)
Facility: CLINIC | Age: 89
End: 2025-08-15
Payer: MEDICARE

## 2025-08-15 VITALS — BODY MASS INDEX: 25.73 KG/M2 | HEIGHT: 72 IN | WEIGHT: 190 LBS

## 2025-08-15 DIAGNOSIS — M17.12 UNILATERAL PRIMARY OSTEOARTHRITIS, LEFT KNEE: ICD-10-CM

## 2025-08-15 DIAGNOSIS — Z78.9 OTHER SPECIFIED HEALTH STATUS: ICD-10-CM

## 2025-08-15 DIAGNOSIS — M17.11 UNILATERAL PRIMARY OSTEOARTHRITIS, RIGHT KNEE: ICD-10-CM

## 2025-08-15 PROCEDURE — 73564 X-RAY EXAM KNEE 4 OR MORE: CPT | Mod: 50

## 2025-08-15 PROCEDURE — 99213 OFFICE O/P EST LOW 20 MIN: CPT

## 2025-08-15 RX ORDER — HYALURONATE SODIUM 30 MG/2 ML
30 SYRINGE (ML) INTRAARTICULAR
Qty: 4 | Refills: 0 | Status: ACTIVE | COMMUNITY
Start: 2025-08-15 | End: 1900-01-01